# Patient Record
Sex: FEMALE | Race: WHITE | NOT HISPANIC OR LATINO | Employment: UNEMPLOYED | RURAL
[De-identification: names, ages, dates, MRNs, and addresses within clinical notes are randomized per-mention and may not be internally consistent; named-entity substitution may affect disease eponyms.]

---

## 2017-03-22 ENCOUNTER — OFFICE VISIT (OUTPATIENT)
Dept: CARDIOLOGY | Facility: CLINIC | Age: 67
End: 2017-03-22

## 2017-03-22 VITALS
HEART RATE: 64 BPM | DIASTOLIC BLOOD PRESSURE: 82 MMHG | BODY MASS INDEX: 29.63 KG/M2 | WEIGHT: 161 LBS | HEIGHT: 62 IN | SYSTOLIC BLOOD PRESSURE: 172 MMHG

## 2017-03-22 DIAGNOSIS — I10 ESSENTIAL HYPERTENSION: Primary | ICD-10-CM

## 2017-03-22 DIAGNOSIS — E78.5 DYSLIPIDEMIA: ICD-10-CM

## 2017-03-22 DIAGNOSIS — R06.02 SOB (SHORTNESS OF BREATH): ICD-10-CM

## 2017-03-22 PROCEDURE — 99213 OFFICE O/P EST LOW 20 MIN: CPT | Performed by: INTERNAL MEDICINE

## 2017-03-22 PROCEDURE — 93000 ELECTROCARDIOGRAM COMPLETE: CPT | Performed by: INTERNAL MEDICINE

## 2017-03-22 RX ORDER — LOSARTAN POTASSIUM 50 MG/1
50 TABLET ORAL 2 TIMES DAILY
COMMUNITY
End: 2018-04-09 | Stop reason: ALTCHOICE

## 2017-03-22 RX ORDER — HYDROCHLOROTHIAZIDE 12.5 MG/1
12.5 CAPSULE, GELATIN COATED ORAL EVERY MORNING
Qty: 30 CAPSULE | Refills: 7 | Status: SHIPPED | OUTPATIENT
Start: 2017-03-22 | End: 2017-11-30

## 2017-03-22 RX ORDER — PROMETHAZINE HYDROCHLORIDE 25 MG/1
25 TABLET ORAL EVERY 6 HOURS PRN
COMMUNITY
End: 2017-10-25 | Stop reason: ALTCHOICE

## 2017-03-22 NOTE — PROGRESS NOTES
"Kendrick Lemus MD  Valerie Stroud  1950      Patient Active Problem List   Diagnosis   • Essential hypertension   • Dyslipidemia   • SOB (shortness of breath)       Dear Dr. Lemus:    Jeanine Stroud is a 67 y.o. female with the problems as listed above, presents for follow up of hypertension and dyslipidemia.    History of Present Illness: Patient reports her BP runs 170s/80s at home.  She states she's been taking her medications regularly.  Denies chest pain, dizziness or syncope.  She states that she \"antoni\" at night time.    Today, patient states that she feels okay, only has chronic aches and pains all over.  She states she has fibromyalgia and arthritis. Today her blood pressure is elevated.  No Known Allergies:      Current Outpatient Prescriptions:   •  aspirin 81 MG EC tablet, Take 81 mg by mouth daily., Disp: , Rfl:   •  carvedilol (COREG) 12.5 MG tablet, Take 12.5 mg by mouth 2 (two) times a day with meals., Disp: , Rfl:   •  docusate sodium (COLACE) 100 MG capsule, Take 100 mg by mouth 2 (two) times a day., Disp: , Rfl:   •  HYDROcodone-acetaminophen (NORCO) 7.5-325 MG per tablet, Take 1 tablet by mouth every 6 (six) hours as needed for moderate pain (4-6)., Disp: , Rfl:   •  hydrOXYzine (VISTARIL) 25 MG capsule, Take 25 mg by mouth 2 (two) times a day., Disp: , Rfl:   •  losartan (COZAAR) 50 MG tablet, Take 50 mg by mouth 2 (Two) Times a Day., Disp: , Rfl:   •  metFORMIN (GLUCOPHAGE) 500 MG tablet, Take 500 mg by mouth 2 (Two) Times a Day With Meals., Disp: , Rfl:   •  promethazine (PHENERGAN) 25 MG tablet, Take 25 mg by mouth Every 6 (Six) Hours As Needed for Nausea or Vomiting., Disp: , Rfl:   •  QUEtiapine (SEROquel) 200 MG tablet, Take 200 mg by mouth every night., Disp: , Rfl:   •  simvastatin (ZOCOR) 40 MG tablet, Take 40 mg by mouth Every Night., Disp: , Rfl:   •  traMADol (ULTRAM) 50 MG tablet, Take 50 mg by mouth 2 (two) times a day., Disp: , Rfl:   •  " "hydrochlorothiazide (MICROZIDE) 12.5 MG capsule, Take 1 capsule by mouth Every Morning., Disp: 30 capsule, Rfl: 7      The following portions of the patient's history were reviewed and updated as appropriate: allergies, current medications, past family history, past medical history, past social history, past surgical history and problem list.    Social History   Substance Use Topics   • Smoking status: Current Every Day Smoker     Packs/day: 1.50     Years: 25.00     Types: Cigarettes   • Smokeless tobacco: Never Used   • Alcohol use No       Review of Systems   Constitution: Negative for chills and fever.   HENT: Negative for headaches, nosebleeds and sore throat.    Cardiovascular: Positive for leg swelling. Negative for chest pain, palpitations and syncope.   Respiratory: Negative for cough, hemoptysis, shortness of breath and wheezing.    Gastrointestinal: Positive for bloating. Negative for abdominal pain, hematemesis, hematochezia, melena, nausea and vomiting.   Genitourinary: Negative for dysuria and hematuria.   Neurological: Positive for light-headedness. Negative for dizziness.       Objective   Vitals:    03/22/17 1415   BP: 172/82   BP Location: Left arm   Patient Position: Sitting   Cuff Size: Adult   Pulse: 64   Weight: 161 lb (73 kg)   Height: 62\" (157.5 cm)     Body mass index is 29.45 kg/(m^2).        Physical Exam   Constitutional: She is oriented to person, place, and time. She appears well-developed and well-nourished.   HENT:   Head: Normocephalic.   Eyes: Conjunctivae and EOM are normal.   Neck: Normal range of motion. Neck supple. No JVD present. No tracheal deviation present. No thyromegaly present.   Cardiovascular: Normal rate and regular rhythm.  Exam reveals no gallop and no friction rub.    No murmur heard.  Pulmonary/Chest: Breath sounds normal. No respiratory distress. She has no wheezes. She has no rales.   Abdominal: Soft. Bowel sounds are normal. She exhibits no mass. There is no " tenderness.   Musculoskeletal: She exhibits no edema.   Neurological: She is alert and oriented to person, place, and time. No cranial nerve deficit.   Skin: Skin is warm and dry.   Psychiatric: She has a normal mood and affect.       Lab Results   Component Value Date     12/19/2015    K 3.6 12/19/2015     12/19/2015    CO2 29.0 12/19/2015    BUN 17 12/19/2015    CREATININE 0.69 12/19/2015    GLUCOSE 148 (H) 12/19/2015    CALCIUM 8.9 12/19/2015    AST 32 (H) 12/19/2015    ALT 29 12/19/2015    ALKPHOS 68 12/19/2015    LABIL2 1.6 12/19/2015     No results found for: CKTOTAL  Lab Results   Component Value Date    WBC 7.1 12/19/2015    HGB 13.1 12/19/2015    HCT 39.1 12/19/2015     12/19/2015     No results found for: INR  No results found for: MG  Lab Results   Component Value Date    TSH 3.039 12/19/2015      No results found for: BNP      ECG 12 Lead  Date/Time: 3/22/2017 1:52 PM  Performed by: CHRIST MORALES  Authorized by: CHRIST MORALES               Assessment/Plan :  Valerie was seen today for follow-up.  Diagnoses and all orders for this visit:    Essential hypertension with intermittent elevations of her blood pressure at home.  Chronic bilateral leg edema with no evidence of DVT.       Recommendations:    1. Start HCTZ 12.5mg po daily.  2. Advised on low sodium and low carb diet.    Return in about 7 months (around 10/22/2017).    As always, I appreciate very much the opportunity to participate in the cardiovascular care of your patients.      With Best Regards,    Christ Morales MD, [facility]    Scribed for Christ Morales MD by Briseida Henry [facility] 3/22/2017  3:17 PM     I, Christ Morales MD, personally performed the services described in this documentation as scribed by the above named individual in my presence, and it is both accurate and complete.  3/22/2017  3:17 PM

## 2017-06-01 RX ORDER — CARVEDILOL 12.5 MG/1
TABLET ORAL
Qty: 180 TABLET | Refills: 0 | Status: SHIPPED | OUTPATIENT
Start: 2017-06-01 | End: 2017-08-29 | Stop reason: SDUPTHER

## 2017-08-29 RX ORDER — CARVEDILOL 12.5 MG/1
TABLET ORAL
Qty: 180 TABLET | Refills: 2 | Status: SHIPPED | OUTPATIENT
Start: 2017-08-29 | End: 2018-07-20 | Stop reason: SDUPTHER

## 2017-10-24 ENCOUNTER — HOSPITAL ENCOUNTER (OUTPATIENT)
Dept: GENERAL RADIOLOGY | Facility: HOSPITAL | Age: 67
Discharge: HOME | End: 2017-10-24
Admitting: PHYSICIAN ASSISTANT

## 2017-10-24 ENCOUNTER — CONSULT (OUTPATIENT)
Dept: GASTROENTEROLOGY | Facility: CLINIC | Age: 67
End: 2017-10-24

## 2017-10-24 ENCOUNTER — LAB (OUTPATIENT)
Dept: LAB | Facility: HOSPITAL | Age: 67
End: 2017-10-24

## 2017-10-24 VITALS
SYSTOLIC BLOOD PRESSURE: 163 MMHG | WEIGHT: 166.8 LBS | DIASTOLIC BLOOD PRESSURE: 75 MMHG | OXYGEN SATURATION: 97 % | HEIGHT: 62 IN | BODY MASS INDEX: 30.69 KG/M2 | HEART RATE: 71 BPM

## 2017-10-24 DIAGNOSIS — R68.81 EARLY SATIETY: ICD-10-CM

## 2017-10-24 DIAGNOSIS — Z86.010 HISTORY OF COLON POLYPS: ICD-10-CM

## 2017-10-24 DIAGNOSIS — R10.11 RUQ ABDOMINAL PAIN: ICD-10-CM

## 2017-10-24 DIAGNOSIS — K59.00 CONSTIPATION, UNSPECIFIED CONSTIPATION TYPE: ICD-10-CM

## 2017-10-24 DIAGNOSIS — R10.13 EPIGASTRIC ABDOMINAL PAIN: ICD-10-CM

## 2017-10-24 DIAGNOSIS — K59.00 CONSTIPATION, UNSPECIFIED CONSTIPATION TYPE: Primary | ICD-10-CM

## 2017-10-24 DIAGNOSIS — R10.13 DYSPEPSIA: ICD-10-CM

## 2017-10-24 DIAGNOSIS — R63.5 WEIGHT GAIN, ABNORMAL: ICD-10-CM

## 2017-10-24 LAB
ALBUMIN SERPL-MCNC: 4.4 G/DL (ref 3.4–4.8)
ALBUMIN/GLOB SERPL: 1.5 G/DL (ref 1.5–2.5)
ALP SERPL-CCNC: 85 U/L (ref 35–104)
ALT SERPL W P-5'-P-CCNC: 25 U/L (ref 10–36)
AMYLASE SERPL-CCNC: 58 U/L (ref 28–100)
ANION GAP SERPL CALCULATED.3IONS-SCNC: 3.7 MMOL/L (ref 3.6–11.2)
AST SERPL-CCNC: 22 U/L (ref 10–30)
BASOPHILS # BLD AUTO: 0.02 10*3/MM3 (ref 0–0.3)
BASOPHILS NFR BLD AUTO: 0.2 % (ref 0–2)
BILIRUB SERPL-MCNC: 0.2 MG/DL (ref 0.2–1.8)
BUN BLD-MCNC: 27 MG/DL (ref 7–21)
BUN/CREAT SERPL: 35.1 (ref 7–25)
CALCIUM SPEC-SCNC: 9.9 MG/DL (ref 7.7–10)
CHLORIDE SERPL-SCNC: 107 MMOL/L (ref 99–112)
CO2 SERPL-SCNC: 29.3 MMOL/L (ref 24.3–31.9)
CREAT BLD-MCNC: 0.77 MG/DL (ref 0.43–1.29)
CRP SERPL-MCNC: 1.37 MG/DL (ref 0–0.99)
DEPRECATED RDW RBC AUTO: 43 FL (ref 37–54)
EOSINOPHIL # BLD AUTO: 0.27 10*3/MM3 (ref 0–0.7)
EOSINOPHIL NFR BLD AUTO: 3.1 % (ref 0–7)
ERYTHROCYTE [DISTWIDTH] IN BLOOD BY AUTOMATED COUNT: 13.3 % (ref 11.5–14.5)
GFR SERPL CREATININE-BSD FRML MDRD: 75 ML/MIN/1.73
GLOBULIN UR ELPH-MCNC: 3 GM/DL
GLUCOSE BLD-MCNC: 105 MG/DL (ref 70–110)
HCT VFR BLD AUTO: 39.5 % (ref 37–47)
HGB BLD-MCNC: 13.2 G/DL (ref 12–16)
IMM GRANULOCYTES # BLD: 0.02 10*3/MM3 (ref 0–0.03)
IMM GRANULOCYTES NFR BLD: 0.2 % (ref 0–0.5)
LIPASE SERPL-CCNC: 46 U/L (ref 13–60)
LYMPHOCYTES # BLD AUTO: 2.98 10*3/MM3 (ref 1–3)
LYMPHOCYTES NFR BLD AUTO: 33.8 % (ref 16–46)
MCH RBC QN AUTO: 30 PG (ref 27–33)
MCHC RBC AUTO-ENTMCNC: 33.4 G/DL (ref 33–37)
MCV RBC AUTO: 89.8 FL (ref 80–94)
MONOCYTES # BLD AUTO: 1.09 10*3/MM3 (ref 0.1–0.9)
MONOCYTES NFR BLD AUTO: 12.4 % (ref 0–12)
NEUTROPHILS # BLD AUTO: 4.43 10*3/MM3 (ref 1.4–6.5)
NEUTROPHILS NFR BLD AUTO: 50.3 % (ref 40–75)
OSMOLALITY SERPL CALC.SUM OF ELEC: 284.9 MOSM/KG (ref 273–305)
PLATELET # BLD AUTO: 264 10*3/MM3 (ref 130–400)
PMV BLD AUTO: 9.9 FL (ref 6–10)
POTASSIUM BLD-SCNC: 3.7 MMOL/L (ref 3.5–5.3)
PROT SERPL-MCNC: 7.4 G/DL (ref 6–8)
RBC # BLD AUTO: 4.4 10*6/MM3 (ref 4.2–5.4)
SODIUM BLD-SCNC: 140 MMOL/L (ref 135–153)
T4 FREE SERPL-MCNC: 0.92 NG/DL (ref 0.89–1.76)
TSH SERPL DL<=0.05 MIU/L-ACNC: 3.29 MIU/ML (ref 0.55–4.78)
WBC NRBC COR # BLD: 8.81 10*3/MM3 (ref 4.5–12.5)

## 2017-10-24 PROCEDURE — 86140 C-REACTIVE PROTEIN: CPT | Performed by: PHYSICIAN ASSISTANT

## 2017-10-24 PROCEDURE — 82150 ASSAY OF AMYLASE: CPT | Performed by: PHYSICIAN ASSISTANT

## 2017-10-24 PROCEDURE — 74020 HC XR ABDOMEN FLAT & UPRIGHT: CPT

## 2017-10-24 PROCEDURE — 84439 ASSAY OF FREE THYROXINE: CPT | Performed by: PHYSICIAN ASSISTANT

## 2017-10-24 PROCEDURE — 74020 XR ABDOMEN FLAT AND UPRIGHT: CPT | Performed by: RADIOLOGY

## 2017-10-24 PROCEDURE — 99214 OFFICE O/P EST MOD 30 MIN: CPT | Performed by: PHYSICIAN ASSISTANT

## 2017-10-24 PROCEDURE — 80053 COMPREHEN METABOLIC PANEL: CPT | Performed by: PHYSICIAN ASSISTANT

## 2017-10-24 PROCEDURE — 83690 ASSAY OF LIPASE: CPT | Performed by: PHYSICIAN ASSISTANT

## 2017-10-24 PROCEDURE — 84443 ASSAY THYROID STIM HORMONE: CPT | Performed by: PHYSICIAN ASSISTANT

## 2017-10-24 PROCEDURE — 36415 COLL VENOUS BLD VENIPUNCTURE: CPT

## 2017-10-24 PROCEDURE — 85025 COMPLETE CBC W/AUTO DIFF WBC: CPT | Performed by: PHYSICIAN ASSISTANT

## 2017-10-24 RX ORDER — GLIMEPIRIDE 4 MG/1
4 TABLET ORAL
COMMUNITY

## 2017-10-24 RX ORDER — OMEPRAZOLE 40 MG/1
40 CAPSULE, DELAYED RELEASE ORAL DAILY
COMMUNITY
End: 2019-06-24

## 2017-10-24 RX ORDER — BISACODYL 5 MG/1
TABLET, DELAYED RELEASE ORAL
Qty: 2 TABLET | Refills: 0 | Status: SHIPPED | OUTPATIENT
Start: 2017-10-24 | End: 2017-10-25 | Stop reason: ALTCHOICE

## 2017-10-25 ENCOUNTER — OFFICE VISIT (OUTPATIENT)
Dept: CARDIOLOGY | Facility: CLINIC | Age: 67
End: 2017-10-25

## 2017-10-25 ENCOUNTER — TELEPHONE (OUTPATIENT)
Dept: GASTROENTEROLOGY | Facility: CLINIC | Age: 67
End: 2017-10-25

## 2017-10-25 VITALS
BODY MASS INDEX: 30.91 KG/M2 | DIASTOLIC BLOOD PRESSURE: 70 MMHG | HEIGHT: 62 IN | WEIGHT: 168 LBS | RESPIRATION RATE: 16 BRPM | SYSTOLIC BLOOD PRESSURE: 137 MMHG | HEART RATE: 61 BPM

## 2017-10-25 DIAGNOSIS — I10 ESSENTIAL HYPERTENSION: Primary | ICD-10-CM

## 2017-10-25 DIAGNOSIS — R60.9 EDEMA, UNSPECIFIED TYPE: ICD-10-CM

## 2017-10-25 DIAGNOSIS — E78.5 DYSLIPIDEMIA: ICD-10-CM

## 2017-10-25 DIAGNOSIS — R06.00 DYSPNEA, UNSPECIFIED TYPE: ICD-10-CM

## 2017-10-25 PROBLEM — R10.11 RUQ ABDOMINAL PAIN: Status: ACTIVE | Noted: 2017-10-25

## 2017-10-25 PROBLEM — R63.5 WEIGHT GAIN, ABNORMAL: Status: ACTIVE | Noted: 2017-10-25

## 2017-10-25 PROBLEM — Z86.010 HISTORY OF COLON POLYPS: Status: ACTIVE | Noted: 2017-10-25

## 2017-10-25 PROBLEM — K59.00 CONSTIPATION: Status: ACTIVE | Noted: 2017-10-25

## 2017-10-25 PROBLEM — R10.13 DYSPEPSIA: Status: ACTIVE | Noted: 2017-10-25

## 2017-10-25 PROBLEM — Z86.0100 HISTORY OF COLON POLYPS: Status: ACTIVE | Noted: 2017-10-25

## 2017-10-25 PROBLEM — R10.13 EPIGASTRIC ABDOMINAL PAIN: Status: ACTIVE | Noted: 2017-10-25

## 2017-10-25 PROBLEM — R68.81 EARLY SATIETY: Status: ACTIVE | Noted: 2017-10-25

## 2017-10-25 PROCEDURE — 99213 OFFICE O/P EST LOW 20 MIN: CPT | Performed by: INTERNAL MEDICINE

## 2017-10-25 PROCEDURE — 93000 ELECTROCARDIOGRAM COMPLETE: CPT | Performed by: INTERNAL MEDICINE

## 2017-10-25 RX ORDER — FUROSEMIDE 20 MG/1
20 TABLET ORAL DAILY
Qty: 180 TABLET | Refills: 1 | Status: SHIPPED | OUTPATIENT
Start: 2017-10-25 | End: 2020-08-05 | Stop reason: ALTCHOICE

## 2017-10-25 RX ORDER — POLYETHYLENE GLYCOL 3350 17 G/17G
17 POWDER, FOR SOLUTION ORAL 2 TIMES DAILY
Qty: 510 G | Refills: 5 | Status: SHIPPED | OUTPATIENT
Start: 2017-10-25 | End: 2017-10-25 | Stop reason: ALTCHOICE

## 2017-10-25 RX ORDER — GABAPENTIN 300 MG/1
300 CAPSULE ORAL 3 TIMES DAILY
COMMUNITY
End: 2019-05-08 | Stop reason: ALTCHOICE

## 2017-10-25 NOTE — TELEPHONE ENCOUNTER
Patient's labs were ok other than mildly increased CRP (marker of inflammation). Abdominal film was read as normal but on my review, there is increased stool on the right side. She should have magnesium citrate clean out (2 bottles) then start taking Miralax 17 g BID until her procedure then clean out again. Please let her know. Thanks.

## 2017-10-25 NOTE — PROGRESS NOTES
Kendrick Lemus MD  Valerie Stroud  1950  10/25/2017    Patient Active Problem List   Diagnosis   • Essential hypertension   • Dyslipidemia   • Dyspnea, unspecified   • Early satiety   • Dyspepsia   • Epigastric abdominal pain   • RUQ abdominal pain   • Weight gain, abnormal   • History of colon polyps   • Constipation       Dear Kendrick Lemus MD:    Jeanine Stroud is a 67 y.o. female with the problems as listed above, presentsFor follow-up of dyspnea and leg edema.      History of Present Illness: Ms. Stroud is a pleasant 67-year-old  female with a history of chronic bilateral leg edema and intermittent dyspnea.  She has no history of known heart failure or coronary artery disease.  She denies any chest pains.  She has one pillow orthopnea.  presents for routine cardiology follow up.  She reports chronic bilateral leg edema.       No Known Allergies:      Current Outpatient Prescriptions:   •  aspirin 81 MG EC tablet, Take 81 mg by mouth daily., Disp: , Rfl:   •  carvedilol (COREG) 12.5 MG tablet, TAKE ONE TABLET BY MOUTH TWO TIMES A DAY FOR THE HEART OR BLOOD PRESSURE, Disp: 180 tablet, Rfl: 2  •  docusate sodium (COLACE) 100 MG capsule, Take 100 mg by mouth 2 (two) times a day., Disp: , Rfl:   •  gabapentin (NEURONTIN) 300 MG capsule, Take 300 mg by mouth 3 (Three) Times a Day., Disp: , Rfl:   •  glimepiride (AMARYL) 1 MG tablet, Take 1 mg by mouth Every Morning Before Breakfast., Disp: , Rfl:   •  hydrochlorothiazide (MICROZIDE) 12.5 MG capsule, Take 1 capsule by mouth Every Morning., Disp: 30 capsule, Rfl: 7  •  hydrOXYzine (VISTARIL) 25 MG capsule, Take 25 mg by mouth 2 (two) times a day., Disp: , Rfl:   •  losartan (COZAAR) 50 MG tablet, Take 50 mg by mouth 2 (Two) Times a Day., Disp: , Rfl:   •  omeprazole (priLOSEC) 40 MG capsule, Take 40 mg by mouth Daily., Disp: , Rfl:   •  QUEtiapine (SEROquel) 200 MG tablet, Take 200 mg by mouth every night., Disp: , Rfl:  "  •  simvastatin (ZOCOR) 40 MG tablet, Take 40 mg by mouth Every Night., Disp: , Rfl:   •  traMADol (ULTRAM) 50 MG tablet, Take 50 mg by mouth 2 (two) times a day., Disp: , Rfl:   •  furosemide (LASIX) 20 MG tablet, Take 1 tablet by mouth Daily., Disp: 180 tablet, Rfl: 1      The following portions of the patient's history were reviewed and updated as appropriate: allergies, current medications, past family history, past medical history, past social history, past surgical history and problem list.    Social History   Substance Use Topics   • Smoking status: Current Every Day Smoker     Packs/day: 1.50     Years: 25.00     Types: Cigarettes   • Smokeless tobacco: Never Used   • Alcohol use No       Review of Systems   Constitution: Negative for chills and fever.   HENT: Negative for nosebleeds and sore throat.    Cardiovascular: Positive for chest pain, leg swelling and orthopnea.   Respiratory: Negative for cough, hemoptysis and wheezing.    Gastrointestinal: Negative for abdominal pain, hematemesis, hematochezia, melena, nausea and vomiting.   Genitourinary: Negative for dysuria and hematuria.   Neurological: Negative for headaches.       Objective   Vitals:    10/25/17 1236   BP: 137/70   Pulse: 61   Resp: 16   Weight: 168 lb (76.2 kg)   Height: 62\" (157.5 cm)     Body mass index is 30.73 kg/(m^2).        Physical Exam   Constitutional: She is oriented to person, place, and time. She appears well-developed and well-nourished.   HENT:   Head: Normocephalic.   Eyes: Conjunctivae and EOM are normal.   Neck: Normal range of motion. Neck supple. No JVD present. No tracheal deviation present. No thyromegaly present.   Cardiovascular: Normal rate and regular rhythm.  Exam reveals no gallop and no friction rub.    No murmur heard.  Pulmonary/Chest: Breath sounds normal. No respiratory distress. She has no wheezes. She has no rales.   Abdominal: Soft. Bowel sounds are normal. She exhibits no mass. There is no tenderness. "   Musculoskeletal: She exhibits edema (1+ bilateral leg edema).   Neurological: She is alert and oriented to person, place, and time. No cranial nerve deficit.   Skin: Skin is warm and dry.   Psychiatric: She has a normal mood and affect.       Lab Results   Component Value Date     10/24/2017    K 3.7 10/24/2017     10/24/2017    CO2 29.3 10/24/2017    BUN 27 (H) 10/24/2017    CREATININE 0.77 10/24/2017    GLUCOSE 105 10/24/2017    CALCIUM 9.9 10/24/2017    AST 22 10/24/2017    ALT 25 10/24/2017    ALKPHOS 85 10/24/2017    LABIL2 1.5 10/24/2017     No results found for: CKTOTAL  Lab Results   Component Value Date    WBC 8.81 10/24/2017    HGB 13.2 10/24/2017    HCT 39.5 10/24/2017     10/24/2017     No results found for: INR  No results found for: MG  Lab Results   Component Value Date    TSH 3.293 10/24/2017      No results found for: BNP  Echo   No results found for: ECHOEFEST    ECG 12 Lead  Date/Time: 10/25/2017 12:41 PM  Performed by: OBDULIA MORALES  Authorized by: OBDULIA MORALES   Rhythm: sinus rhythm  Comments: Nonspecific ST-T wave changes noted bilaterally wall.            Assessment/Plan    Diagnosis Plan   1. Dyspnea, unspecified type  Check BNP and BMP.     2. Dyslipidemia     3. Essential hypertension       4. Edema, unspecified type  furosemide (LASIX) 20 MG tablet        Recommendations:     1. Evaluate her LV function with an echo Doppler study.  2. Start Lasix 20 mg daily.  3. Check BMP and BNP.    Return in about 3 months (around 1/25/2018).    As always, I appreciate very much the opportunity to participate in the cardiovascular care of your patients.      With Best Regards,      SANDRA Morales MD, FACC    Dragon disclaimer:  Much of this encounter note is an electronic transcription/translation of spoken language to printed text. The electronic translation of spoken language may permit erroneous, or at times, nonsensical words or phrases to be inadvertently transcribed; Although I  have reviewed the note for such errors, some may still exist.

## 2017-11-27 ENCOUNTER — HOSPITAL ENCOUNTER (OUTPATIENT)
Dept: CARDIOLOGY | Facility: HOSPITAL | Age: 67
Discharge: HOME | End: 2017-11-27
Attending: INTERNAL MEDICINE | Admitting: INTERNAL MEDICINE

## 2017-11-27 DIAGNOSIS — R06.00 DYSPNEA, UNSPECIFIED TYPE: ICD-10-CM

## 2017-11-27 PROCEDURE — 93306 TTE W/DOPPLER COMPLETE: CPT

## 2017-11-27 PROCEDURE — 93306 TTE W/DOPPLER COMPLETE: CPT | Performed by: INTERNAL MEDICINE

## 2017-11-28 DIAGNOSIS — Z79.899 DRUG THERAPY: Primary | ICD-10-CM

## 2017-12-01 ENCOUNTER — HOSPITAL ENCOUNTER (OUTPATIENT)
Facility: HOSPITAL | Age: 67
Setting detail: HOSPITAL OUTPATIENT SURGERY
Discharge: HOME | End: 2017-12-01
Attending: INTERNAL MEDICINE | Admitting: INTERNAL MEDICINE

## 2017-12-01 ENCOUNTER — ANESTHESIA EVENT (OUTPATIENT)
Dept: PERIOP | Facility: HOSPITAL | Age: 67
End: 2017-12-01

## 2017-12-01 ENCOUNTER — ANESTHESIA (OUTPATIENT)
Dept: PERIOP | Facility: HOSPITAL | Age: 67
End: 2017-12-01

## 2017-12-01 VITALS
BODY MASS INDEX: 30.55 KG/M2 | HEART RATE: 68 BPM | SYSTOLIC BLOOD PRESSURE: 142 MMHG | OXYGEN SATURATION: 96 % | HEIGHT: 62 IN | WEIGHT: 166 LBS | RESPIRATION RATE: 20 BRPM | TEMPERATURE: 97.6 F | DIASTOLIC BLOOD PRESSURE: 63 MMHG

## 2017-12-01 DIAGNOSIS — R10.13 DYSPEPSIA: ICD-10-CM

## 2017-12-01 DIAGNOSIS — R68.81 EARLY SATIETY: ICD-10-CM

## 2017-12-01 DIAGNOSIS — R10.13 EPIGASTRIC ABDOMINAL PAIN: ICD-10-CM

## 2017-12-01 DIAGNOSIS — Z86.010 HISTORY OF COLON POLYPS: ICD-10-CM

## 2017-12-01 DIAGNOSIS — R63.5 WEIGHT GAIN, ABNORMAL: ICD-10-CM

## 2017-12-01 DIAGNOSIS — K59.00 CONSTIPATION, UNSPECIFIED CONSTIPATION TYPE: ICD-10-CM

## 2017-12-01 DIAGNOSIS — R10.11 RUQ ABDOMINAL PAIN: ICD-10-CM

## 2017-12-01 PROBLEM — Z86.0100 HISTORY OF COLON POLYPS: Status: ACTIVE | Noted: 2017-10-25

## 2017-12-01 LAB
BH CV ECHO MEAS - % IVS THICK: 55.7 %
BH CV ECHO MEAS - % LVPW THICK: 101 %
BH CV ECHO MEAS - ACS: 2.2 CM
BH CV ECHO MEAS - AO ROOT AREA (BSA CORRECTED): 1.9
BH CV ECHO MEAS - AO ROOT AREA: 8.7 CM^2
BH CV ECHO MEAS - AO ROOT DIAM: 3.3 CM
BH CV ECHO MEAS - BSA(HAYCOCK): 1.9 M^2
BH CV ECHO MEAS - BSA: 1.8 M^2
BH CV ECHO MEAS - BZI_BMI: 30.7 KILOGRAMS/M^2
BH CV ECHO MEAS - BZI_METRIC_HEIGHT: 157.5 CM
BH CV ECHO MEAS - BZI_METRIC_WEIGHT: 76.2 KG
BH CV ECHO MEAS - CONTRAST EF 4CH: 52.1 ML/M^2
BH CV ECHO MEAS - EDV(CUBED): 122.6 ML
BH CV ECHO MEAS - EDV(MOD-SP4): 48 ML
BH CV ECHO MEAS - EDV(TEICH): 116.5 ML
BH CV ECHO MEAS - EF(CUBED): 55.4 %
BH CV ECHO MEAS - EF(TEICH): 47 %
BH CV ECHO MEAS - ESV(CUBED): 54.6 ML
BH CV ECHO MEAS - ESV(MOD-SP4): 23 ML
BH CV ECHO MEAS - ESV(TEICH): 61.7 ML
BH CV ECHO MEAS - FS: 23.6 %
BH CV ECHO MEAS - IVS/LVPW: 1.1
BH CV ECHO MEAS - IVSD: 0.9 CM
BH CV ECHO MEAS - IVSS: 1.4 CM
BH CV ECHO MEAS - LA DIMENSION: 2.9 CM
BH CV ECHO MEAS - LA/AO: 0.88
BH CV ECHO MEAS - LV DIASTOLIC VOL/BSA (35-75): 27 ML/M^2
BH CV ECHO MEAS - LV MASS(C)D: 145 GRAMS
BH CV ECHO MEAS - LV MASS(C)DI: 81.7 GRAMS/M^2
BH CV ECHO MEAS - LV MASS(C)S: 216.9 GRAMS
BH CV ECHO MEAS - LV MASS(C)SI: 122.2 GRAMS/M^2
BH CV ECHO MEAS - LV SYSTOLIC VOL/BSA (12-30): 13 ML/M^2
BH CV ECHO MEAS - LVIDD: 5 CM
BH CV ECHO MEAS - LVIDS: 3.8 CM
BH CV ECHO MEAS - LVLD AP4: 7 CM
BH CV ECHO MEAS - LVLS AP4: 6 CM
BH CV ECHO MEAS - LVOT AREA (M): 3.8 CM^2
BH CV ECHO MEAS - LVOT AREA: 3.8 CM^2
BH CV ECHO MEAS - LVOT DIAM: 2.2 CM
BH CV ECHO MEAS - LVPWD: 0.8 CM
BH CV ECHO MEAS - LVPWS: 1.6 CM
BH CV ECHO MEAS - MV A MAX VEL: 113 CM/SEC
BH CV ECHO MEAS - MV E MAX VEL: 94.8 CM/SEC
BH CV ECHO MEAS - MV E/A: 0.84
BH CV ECHO MEAS - PA ACC SLOPE: 1142 CM/SEC^2
BH CV ECHO MEAS - PA ACC TIME: 0.08 SEC
BH CV ECHO MEAS - PA PR(ACCEL): 41 MMHG
BH CV ECHO MEAS - RAP SYSTOLE: 10 MMHG
BH CV ECHO MEAS - RVSP: 34.4 MMHG
BH CV ECHO MEAS - SI(CUBED): 38.3 ML/M^2
BH CV ECHO MEAS - SI(MOD-SP4): 14.1 ML/M^2
BH CV ECHO MEAS - SI(TEICH): 30.8 ML/M^2
BH CV ECHO MEAS - SV(CUBED): 67.9 ML
BH CV ECHO MEAS - SV(MOD-SP4): 25 ML
BH CV ECHO MEAS - SV(TEICH): 54.7 ML
BH CV ECHO MEAS - TR MAX VEL: 247 CM/SEC
MAXIMAL PREDICTED HEART RATE: 152 BPM
STRESS TARGET HR: 129 BPM

## 2017-12-01 PROCEDURE — 25010000002 FENTANYL CITRATE (PF) 100 MCG/2ML SOLUTION: Performed by: NURSE ANESTHETIST, CERTIFIED REGISTERED

## 2017-12-01 PROCEDURE — 25010000002 PROPOFOL 10 MG/ML EMULSION: Performed by: NURSE ANESTHETIST, CERTIFIED REGISTERED

## 2017-12-01 PROCEDURE — 25010000002 MIDAZOLAM PER 1 MG: Performed by: NURSE ANESTHETIST, CERTIFIED REGISTERED

## 2017-12-01 PROCEDURE — 43239 EGD BIOPSY SINGLE/MULTIPLE: CPT | Performed by: INTERNAL MEDICINE

## 2017-12-01 PROCEDURE — 25010000002 PROPOFOL 1000 MG/ML EMULSION: Performed by: NURSE ANESTHETIST, CERTIFIED REGISTERED

## 2017-12-01 PROCEDURE — 88305 TISSUE EXAM BY PATHOLOGIST: CPT | Performed by: INTERNAL MEDICINE

## 2017-12-01 PROCEDURE — 45378 DIAGNOSTIC COLONOSCOPY: CPT | Performed by: INTERNAL MEDICINE

## 2017-12-01 RX ORDER — MIDAZOLAM HYDROCHLORIDE 1 MG/ML
INJECTION INTRAMUSCULAR; INTRAVENOUS AS NEEDED
Status: DISCONTINUED | OUTPATIENT
Start: 2017-12-01 | End: 2017-12-01 | Stop reason: SURG

## 2017-12-01 RX ORDER — SODIUM CHLORIDE, SODIUM LACTATE, POTASSIUM CHLORIDE, CALCIUM CHLORIDE 600; 310; 30; 20 MG/100ML; MG/100ML; MG/100ML; MG/100ML
125 INJECTION, SOLUTION INTRAVENOUS CONTINUOUS
Status: DISCONTINUED | OUTPATIENT
Start: 2017-12-01 | End: 2017-12-01 | Stop reason: HOSPADM

## 2017-12-01 RX ORDER — PROPOFOL 10 MG/ML
VIAL (ML) INTRAVENOUS AS NEEDED
Status: DISCONTINUED | OUTPATIENT
Start: 2017-12-01 | End: 2017-12-01 | Stop reason: SURG

## 2017-12-01 RX ORDER — SODIUM CHLORIDE 0.9 % (FLUSH) 0.9 %
1-10 SYRINGE (ML) INJECTION AS NEEDED
Status: DISCONTINUED | OUTPATIENT
Start: 2017-12-01 | End: 2017-12-01 | Stop reason: HOSPADM

## 2017-12-01 RX ORDER — ONDANSETRON 2 MG/ML
4 INJECTION INTRAMUSCULAR; INTRAVENOUS ONCE AS NEEDED
Status: DISCONTINUED | OUTPATIENT
Start: 2017-12-01 | End: 2017-12-01 | Stop reason: HOSPADM

## 2017-12-01 RX ORDER — IPRATROPIUM BROMIDE AND ALBUTEROL SULFATE 2.5; .5 MG/3ML; MG/3ML
3 SOLUTION RESPIRATORY (INHALATION) ONCE AS NEEDED
Status: DISCONTINUED | OUTPATIENT
Start: 2017-12-01 | End: 2017-12-01 | Stop reason: HOSPADM

## 2017-12-01 RX ORDER — FENTANYL CITRATE 50 UG/ML
INJECTION, SOLUTION INTRAMUSCULAR; INTRAVENOUS AS NEEDED
Status: DISCONTINUED | OUTPATIENT
Start: 2017-12-01 | End: 2017-12-01 | Stop reason: SURG

## 2017-12-01 RX ADMIN — PROPOFOL 120 MCG/KG/MIN: 10 INJECTION, EMULSION INTRAVENOUS at 12:17

## 2017-12-01 RX ADMIN — FENTANYL CITRATE 50 MCG: 50 INJECTION INTRAMUSCULAR; INTRAVENOUS at 12:13

## 2017-12-01 RX ADMIN — MIDAZOLAM HYDROCHLORIDE 1 MG: 1 INJECTION, SOLUTION INTRAMUSCULAR; INTRAVENOUS at 12:16

## 2017-12-01 RX ADMIN — PROPOFOL 60 MG: 10 INJECTION, EMULSION INTRAVENOUS at 12:17

## 2017-12-01 RX ADMIN — SODIUM CHLORIDE, POTASSIUM CHLORIDE, SODIUM LACTATE AND CALCIUM CHLORIDE: 600; 310; 30; 20 INJECTION, SOLUTION INTRAVENOUS at 12:13

## 2017-12-01 RX ADMIN — FENTANYL CITRATE 50 MCG: 50 INJECTION INTRAMUSCULAR; INTRAVENOUS at 12:16

## 2017-12-01 RX ADMIN — MIDAZOLAM HYDROCHLORIDE 1 MG: 1 INJECTION, SOLUTION INTRAMUSCULAR; INTRAVENOUS at 12:13

## 2017-12-01 NOTE — PLAN OF CARE
Problem: GI Endoscopy (Adult)  Goal: Signs and Symptoms of Listed Potential Problems Will be Absent or Manageable (GI Endoscopy)  Outcome: Ongoing (interventions implemented as appropriate)    12/01/17 1219   GI Endoscopy   Problems Assessed (GI Endoscopy) all   Problems Present (GI Endoscopy) none

## 2017-12-01 NOTE — ANESTHESIA PREPROCEDURE EVALUATION
Anesthesia Evaluation     no history of anesthetic complications:  NPO Solid Status: > 8 hours  NPO Liquid Status: > 8 hours     Airway   Mallampati: II  TM distance: >3 FB  Neck ROM: full  no difficulty expected  Dental      Pulmonary - normal exam   (+) shortness of breath,   Cardiovascular - normal exam    (+) hypertension, hyperlipidemia      Neuro/Psych  GI/Hepatic/Renal/Endo    (+)  GERD, diabetes mellitus,     Musculoskeletal     Abdominal  - normal exam   Substance History      OB/GYN          Other                                    Anesthesia Plan    ASA 3     general     intravenous induction   Anesthetic plan and risks discussed with patient.

## 2017-12-01 NOTE — ANESTHESIA POSTPROCEDURE EVALUATION
Patient: Valerie Stroud    Procedure Summary     Date Anesthesia Start Anesthesia Stop Room / Location    12/01/17 1213 1231 [facility] / [facility]       Procedure Diagnosis Surgeon Provider    ESOPHAGOGASTRODUODENOSCOPY WITH BIOPSY CPT CODE: 62895 (N/A Esophagus); COLONOSCOPY FOR SCREENING CPT CODE:  (N/A ) Early satiety; Dyspepsia; Epigastric abdominal pain; RUQ abdominal pain; Weight gain, abnormal; History of colon polyps; Constipation, unspecified constipation type  (Early satiety [R68.81]; Dyspepsia [R10.13]; Epigastric abdominal pain [R10.13]; RUQ abdominal pain [R10.11]; Weight gain, abnormal [R63.5]; History of colon polyps [Z86.010]; Constipation, unspecified constipation type [K59.00]) MD Adan Villafana III, MD          Anesthesia Type: general  Last vitals  BP   168/79 (12/01/17 1022)   Temp   97.8 °F (36.6 °C) (12/01/17 1022)   Pulse   78 (12/01/17 1022)   Resp   20 (12/01/17 1022)     SpO2   98 % (12/01/17 1022)     Post Anesthesia Care and Evaluation    Patient location during evaluation: bedside  Patient participation: complete - patient participated  Level of consciousness: awake and alert  Pain score: 1  Pain management: adequate  Airway patency: patent  Anesthetic complications: No anesthetic complications  PONV Status: none  Cardiovascular status: acceptable  Respiratory status: acceptable  Hydration status: acceptable

## 2017-12-01 NOTE — H&P
History and physical examination  December 1, 2017    HPI  67-year-old white female presents for EGD and colonoscopy in order to investigate recurrent abdominal pain, early satiety, constipation.  She has history of colonic polyps.  Colonoscopy was last performed about 5 years ago.      Review of Systems   Negative and noncontributory.    ACTIVE PROBLEMS:   Specialty Problems        Gastroenterology Problems    Constipation              PAST MEDICAL HISTORY:  Past Medical History:   Diagnosis Date   • Arthritis    • DMII (diabetes mellitus, type 2)    • Dyslipidemia    • GERD (gastroesophageal reflux disease)    • HTN (hypertension)    • Hyperlipidemia        SURGICAL HISTORY:  Past Surgical History:   Procedure Laterality Date   • BLADDER SUSPENSION     • CATARACT EXTRACTION W/ INTRAOCULAR LENS  IMPLANT, BILATERAL     • CHOLECYSTECTOMY  11/04/2015    laparoscopic   • COLONOSCOPY  03/30/2009   • ENDOSCOPY  02/2002   • KNEE SURGERY Bilateral    • PARTIAL HYSTERECTOMY     • VARICOSE VEIN SURGERY         FAMILY HISTORY:  Family History   Problem Relation Age of Onset   • Coronary artery disease Mother    • Coronary artery disease Father 69     fatal MI age 69   • Heart attack Father    • Heart defect Sister      pacemaker   • Liver cancer Sister    • Heart attack Brother 29     s/p CAGB   • Coronary artery disease Brother      fatal MI, age 50   • Heart attack Brother 39       SOCIAL HISTORY:  Social History   Substance Use Topics   • Smoking status: Current Every Day Smoker     Packs/day: 0.50     Years: 25.00     Types: Cigarettes   • Smokeless tobacco: Never Used   • Alcohol use No       CURRENT MEDICATION:    Current Facility-Administered Medications:   •  lactated ringers infusion, 125 mL/hr, Intravenous, Continuous, Adan Orozco MD  •  lactated ringers infusion, 125 mL/hr, Intravenous, Continuous, Adan Orozco MD  •  sodium chloride 0.9 % flush 1-10 mL, 1-10 mL, Intravenous, PRN, Adan Orozco MD  •   sodium chloride 0.9 % flush 1-10 mL, 1-10 mL, Intravenous, PRN, Adan Orozco MD     I had reviewed her list of current medications.    ALLERGIES:  Review of patient's allergies indicates no known allergies.    VISIT VITALS:  /79 (BP Location: Right arm, Patient Position: Lying)  Pulse 78  Temp 97.8 °F (36.6 °C) (Oral)   Resp 20  SpO2 98%    PHYSICAL EXAMINATION:  Physical Exam   Alert, oriented ×3  HEENT: Normal  Neck: No mass  Chest: Clear  Rec: Regular rhythm  Abdomen: Soft, nontender, active BS  Extremities: No edema  Neuro: No focal deficit    Assessment/Plan   Generalized abdominal pain  Early satiety  Constipation  History of colon polyps  Colon cancer screening    REC  EGD and screening/surveillance colonoscopy are planned.  The procedures, benefits, risks and alternatives were explained to the patient.         Deshawn Ibanez III, MD

## 2017-12-01 NOTE — OP NOTE
12/01/17    ESOPHAGOGASTRODUODENOSCOPY WITH BIOPSY, COLONOSCOPY FOR SCREENING  Procedure Note    Valerie Stroud  12/1/2017    Pre-op Diagnosis: Abdominal pain, early satiety, constipation, history of colon polyps, colon cancer screening, last colonoscopy was performed about 5 years ago      Post-op Diagnosis:   -Mild gastritis  -Normal colonoscopy        Anesthesia: Per Anesthesia service, general anesthesia      Estimated Blood Loss: Negligible      Findings: EGD performed with careful examination of the esophagus, stomach and duodenum to the fourth portion.  Mild nonerosive gastritis it was present.  The examination was otherwise normal.  Biopsies were taken randomly from the gastric antrum and from the duodenum.    Normal rectal examination.  Colonoscopy performed to the cecum, confirmed by identification of typical cecal anatomy.  Bowel preparation was satisfactory.  All areas were examined carefully and no abnormality was seen.    She tolerated the procedures well and there were no complications.  She left the OR in stable and satisfactory condition.      Complications: None      Recommendations:   Findings were discussed with the patient  Await findings of biopsies and stool studies  Repeat screening/surveillance colonoscopy in about 5 years      Deshawn Ibanez III, MD     Date: 12/1/2017  Time: 12:31 PM     CC:  Dr. Kendrick Lemus

## 2017-12-04 LAB
LAB AP CASE REPORT: NORMAL
Lab: NORMAL
PATH REPORT.FINAL DX SPEC: NORMAL

## 2017-12-07 DIAGNOSIS — Z79.899 DRUG THERAPY CHANGED: Primary | ICD-10-CM

## 2017-12-19 ENCOUNTER — TELEPHONE (OUTPATIENT)
Dept: CARDIOLOGY | Facility: CLINIC | Age: 67
End: 2017-12-19

## 2017-12-19 NOTE — TELEPHONE ENCOUNTER
Improvement noted with recently changing furosemide only as needed.  Her sugar was up at 220.  See if she has known diabetes.  Follow-up with PCP if sugars are running high.

## 2017-12-19 NOTE — TELEPHONE ENCOUNTER
Pt called and left me a message stating she was wanting to know her results to her labs she had done.

## 2017-12-20 PROBLEM — E11.9 TYPE 2 DIABETES MELLITUS WITHOUT COMPLICATION (HCC): Status: ACTIVE | Noted: 2017-12-20

## 2017-12-20 PROBLEM — E10.9 TYPE 1 DIABETES MELLITUS (HCC): Status: ACTIVE | Noted: 2017-12-20

## 2017-12-20 PROBLEM — E11.8 TYPE 2 DIABETES MELLITUS WITH COMPLICATION: Status: ACTIVE | Noted: 2017-12-20

## 2017-12-20 NOTE — TELEPHONE ENCOUNTER
Called and spoke with patient.  Given message per Deyanira Spivey PA-C.   Ms Stroud voiced understanding. I forwarded copy of labs to her PCP.  She stated she has been diagnosed with Diabetes mellitus.  Stated her sugars have been running high and she sees her PCP soon for blood work.

## 2018-02-02 ENCOUNTER — TELEPHONE (OUTPATIENT)
Dept: CARDIOLOGY | Facility: CLINIC | Age: 68
End: 2018-02-02

## 2018-02-02 NOTE — TELEPHONE ENCOUNTER
----- Message from Briseida Henry CMA sent at 1/30/2018  1:05 PM EST -----  Repeat labs done on 12/14/17 attached.

## 2018-04-04 ENCOUNTER — APPOINTMENT (OUTPATIENT)
Dept: CT IMAGING | Facility: HOSPITAL | Age: 68
End: 2018-04-04

## 2018-04-04 ENCOUNTER — HOSPITAL ENCOUNTER (EMERGENCY)
Facility: HOSPITAL | Age: 68
Discharge: HOME | End: 2018-04-04
Attending: EMERGENCY MEDICINE | Admitting: EMERGENCY MEDICINE

## 2018-04-04 VITALS
BODY MASS INDEX: 27.6 KG/M2 | HEART RATE: 73 BPM | SYSTOLIC BLOOD PRESSURE: 163 MMHG | RESPIRATION RATE: 16 BRPM | DIASTOLIC BLOOD PRESSURE: 77 MMHG | TEMPERATURE: 98.2 F | OXYGEN SATURATION: 97 % | WEIGHT: 150 LBS | HEIGHT: 62 IN

## 2018-04-04 DIAGNOSIS — I16.0 HYPERTENSIVE URGENCY: Primary | ICD-10-CM

## 2018-04-04 LAB
ALBUMIN SERPL-MCNC: 4.2 G/DL (ref 3.4–4.8)
ALBUMIN/GLOB SERPL: 1.4 G/DL (ref 1.5–2.5)
ALP SERPL-CCNC: 94 U/L (ref 35–104)
ALT SERPL W P-5'-P-CCNC: 24 U/L (ref 10–36)
ANION GAP SERPL CALCULATED.3IONS-SCNC: 5.7 MMOL/L (ref 3.6–11.2)
AST SERPL-CCNC: 24 U/L (ref 10–30)
BASOPHILS # BLD AUTO: 0.02 10*3/MM3 (ref 0–0.3)
BASOPHILS NFR BLD AUTO: 0.3 % (ref 0–2)
BILIRUB SERPL-MCNC: 0.2 MG/DL (ref 0.2–1.8)
BILIRUB UR QL STRIP: NEGATIVE
BUN BLD-MCNC: 16 MG/DL (ref 7–21)
BUN/CREAT SERPL: 21.6 (ref 7–25)
CALCIUM SPEC-SCNC: 10.1 MG/DL (ref 7.7–10)
CHLORIDE SERPL-SCNC: 106 MMOL/L (ref 99–112)
CK MB SERPL-CCNC: 0.71 NG/ML (ref 0–5)
CK MB SERPL-RTO: 0.9 % (ref 0–3)
CK SERPL-CCNC: 83 U/L (ref 24–173)
CLARITY UR: CLEAR
CO2 SERPL-SCNC: 27.3 MMOL/L (ref 24.3–31.9)
COLOR UR: YELLOW
CREAT BLD-MCNC: 0.74 MG/DL (ref 0.43–1.29)
DEPRECATED RDW RBC AUTO: 39.9 FL (ref 37–54)
EOSINOPHIL # BLD AUTO: 0.29 10*3/MM3 (ref 0–0.7)
EOSINOPHIL NFR BLD AUTO: 4.1 % (ref 0–7)
ERYTHROCYTE [DISTWIDTH] IN BLOOD BY AUTOMATED COUNT: 12.6 % (ref 11.5–14.5)
GFR SERPL CREATININE-BSD FRML MDRD: 78 ML/MIN/1.73
GLOBULIN UR ELPH-MCNC: 3 GM/DL
GLUCOSE BLD-MCNC: 200 MG/DL (ref 70–110)
GLUCOSE UR STRIP-MCNC: NEGATIVE MG/DL
HCT VFR BLD AUTO: 39.6 % (ref 37–47)
HGB BLD-MCNC: 13.7 G/DL (ref 12–16)
HGB UR QL STRIP.AUTO: NEGATIVE
IMM GRANULOCYTES # BLD: 0.01 10*3/MM3 (ref 0–0.03)
IMM GRANULOCYTES NFR BLD: 0.1 % (ref 0–0.5)
KETONES UR QL STRIP: NEGATIVE
LEUKOCYTE ESTERASE UR QL STRIP.AUTO: NEGATIVE
LYMPHOCYTES # BLD AUTO: 2.41 10*3/MM3 (ref 1–3)
LYMPHOCYTES NFR BLD AUTO: 34 % (ref 16–46)
MCH RBC QN AUTO: 30.4 PG (ref 27–33)
MCHC RBC AUTO-ENTMCNC: 34.6 G/DL (ref 33–37)
MCV RBC AUTO: 88 FL (ref 80–94)
MONOCYTES # BLD AUTO: 0.74 10*3/MM3 (ref 0.1–0.9)
MONOCYTES NFR BLD AUTO: 10.5 % (ref 0–12)
NEUTROPHILS # BLD AUTO: 3.61 10*3/MM3 (ref 1.4–6.5)
NEUTROPHILS NFR BLD AUTO: 51 % (ref 40–75)
NITRITE UR QL STRIP: NEGATIVE
OSMOLALITY SERPL CALC.SUM OF ELEC: 284.4 MOSM/KG (ref 273–305)
PH UR STRIP.AUTO: 7.5 [PH] (ref 5–8)
PLATELET # BLD AUTO: 312 10*3/MM3 (ref 130–400)
PMV BLD AUTO: 9.9 FL (ref 6–10)
POTASSIUM BLD-SCNC: 4 MMOL/L (ref 3.5–5.3)
PROT SERPL-MCNC: 7.2 G/DL (ref 6–8)
PROT UR QL STRIP: NEGATIVE
RBC # BLD AUTO: 4.5 10*6/MM3 (ref 4.2–5.4)
SODIUM BLD-SCNC: 139 MMOL/L (ref 135–153)
SP GR UR STRIP: 1.01 (ref 1–1.03)
TROPONIN I SERPL-MCNC: <0.006 NG/ML
UROBILINOGEN UR QL STRIP: NORMAL
WBC NRBC COR # BLD: 7.08 10*3/MM3 (ref 4.5–12.5)

## 2018-04-04 PROCEDURE — 82553 CREATINE MB FRACTION: CPT | Performed by: EMERGENCY MEDICINE

## 2018-04-04 PROCEDURE — 85025 COMPLETE CBC W/AUTO DIFF WBC: CPT | Performed by: EMERGENCY MEDICINE

## 2018-04-04 PROCEDURE — 80053 COMPREHEN METABOLIC PANEL: CPT | Performed by: EMERGENCY MEDICINE

## 2018-04-04 PROCEDURE — 93005 ELECTROCARDIOGRAM TRACING: CPT | Performed by: EMERGENCY MEDICINE

## 2018-04-04 PROCEDURE — 82550 ASSAY OF CK (CPK): CPT | Performed by: EMERGENCY MEDICINE

## 2018-04-04 PROCEDURE — 81003 URINALYSIS AUTO W/O SCOPE: CPT | Performed by: EMERGENCY MEDICINE

## 2018-04-04 PROCEDURE — 70450 CT HEAD/BRAIN W/O DYE: CPT

## 2018-04-04 PROCEDURE — 70450 CT HEAD/BRAIN W/O DYE: CPT | Performed by: RADIOLOGY

## 2018-04-04 PROCEDURE — 84484 ASSAY OF TROPONIN QUANT: CPT | Performed by: EMERGENCY MEDICINE

## 2018-04-04 PROCEDURE — 99285 EMERGENCY DEPT VISIT HI MDM: CPT

## 2018-04-04 PROCEDURE — 93010 ELECTROCARDIOGRAM REPORT: CPT | Performed by: INTERNAL MEDICINE

## 2018-04-04 RX ORDER — LORAZEPAM 2 MG/ML
0.5 INJECTION INTRAMUSCULAR ONCE
Status: DISCONTINUED | OUTPATIENT
Start: 2018-04-04 | End: 2018-04-04

## 2018-04-04 RX ORDER — ALBUTEROL SULFATE 2.5 MG/3ML
2.5 SOLUTION RESPIRATORY (INHALATION) ONCE
Status: DISCONTINUED | OUTPATIENT
Start: 2018-04-04 | End: 2018-04-04

## 2018-04-04 RX ORDER — CLONIDINE HYDROCHLORIDE 0.2 MG/1
0.2 TABLET ORAL 2 TIMES DAILY PRN
Qty: 20 TABLET | Refills: 0 | Status: SHIPPED | OUTPATIENT
Start: 2018-04-04 | End: 2019-06-24

## 2018-04-04 RX ORDER — SODIUM CHLORIDE 0.9 % (FLUSH) 0.9 %
10 SYRINGE (ML) INJECTION AS NEEDED
Status: DISCONTINUED | OUTPATIENT
Start: 2018-04-04 | End: 2018-04-05 | Stop reason: HOSPADM

## 2018-04-04 RX ORDER — METFORMIN HYDROCHLORIDE 500 MG/1
1000 TABLET, EXTENDED RELEASE ORAL 2 TIMES DAILY
COMMUNITY

## 2018-04-04 RX ORDER — CLONIDINE HYDROCHLORIDE 0.1 MG/1
0.1 TABLET ORAL ONCE
Status: COMPLETED | OUTPATIENT
Start: 2018-04-04 | End: 2018-04-04

## 2018-04-04 RX ORDER — BUMETANIDE 0.25 MG/ML
1 INJECTION INTRAMUSCULAR; INTRAVENOUS ONCE
Status: DISCONTINUED | OUTPATIENT
Start: 2018-04-04 | End: 2018-04-04

## 2018-04-04 RX ADMIN — CLONIDINE HYDROCHLORIDE 0.1 MG: 0.1 TABLET ORAL at 20:34

## 2018-04-05 NOTE — ED NOTES
Patient presents to the ER tonight from home due to high blood pressure. Patient states that she believes her blood pressure has been running high for a few weeks, but states that she checked it today and states that it was running 180/90. Patient states that she seen Dr. Lemus this morning, reports that her blood pressure was slightly elevated, but states that he was not concerned and sent her home. Patient states when she got home she rechecked her blood pressure and it was 209/109. Patient states she took an extra blood pressure pill, states that tonight when she went to take her night time medication she checked her blood pressure medication again, states that she checked her blood pressure again and it was high so she decided to come to the ER.     Og Jimenez RN  04/05/18 [id number]

## 2018-04-05 NOTE — ED PROVIDER NOTES
Subjective   Pt comes in with concern for hypertension.  Has had elevated but fluctuating BP all day.  Saw Dr Lemus this morning but the office was not concerned, which made her mad.  Her blood pressure remained high the rest of the afternoon.  Pt reports /109 at home.  Pt has felt funny in her head and had heart racing.  Pt has taken both of her routine doses of BP med plus one extra dose        Hypertension   Severity:  Moderate  Duration: All day.  Timing:  Intermittent  Progression since onset: Waxing and waning.  Chronicity:  Recurrent  Notable PTA blood pressures:  209/109  Context: normal sodium, not caffeine, not drug abuse, not herbal remedies, not medication change, not noncompliance, not OTC medications used and not stress    Relieved by:  Angiotensin blockers  Worsened by:  Nothing  Ineffective treatments:  None tried  Associated symptoms: anxiety, dizziness, fatigue, palpitations and weakness    Associated symptoms: no abdominal pain, no blurred vision, no chest pain, no confusion, no ear pain, no epistaxis, no fever, no headaches, no hematuria, no hypokalemia, no loss of consciousness, no nausea, no neck pain, no peripheral edema, no shortness of breath, no syncope, no tinnitus and not vomiting    Risk factors: cardiac disease, diabetes and obesity    Risk factors: no alcohol use, no cocaine use, no decongestant use, no kidney disease, no prior aneurysm, no prior stroke, no PVD and no tobacco use        Review of Systems   Constitutional: Positive for fatigue. Negative for fever.   HENT: Negative.  Negative for ear pain, nosebleeds and tinnitus.    Eyes: Negative.  Negative for blurred vision.   Respiratory: Negative.  Negative for chest tightness and shortness of breath.    Cardiovascular: Positive for palpitations. Negative for chest pain and syncope.   Gastrointestinal: Negative.  Negative for abdominal pain, nausea and vomiting.   Endocrine: Negative.    Genitourinary: Negative.  Negative  for hematuria.   Musculoskeletal: Negative.  Negative for neck pain.   Skin: Negative.    Allergic/Immunologic: Negative.    Neurological: Positive for dizziness and weakness. Negative for loss of consciousness and headaches.   Hematological: Negative.    Psychiatric/Behavioral: Negative for confusion. The patient is nervous/anxious.    All other systems reviewed and are negative.      Past Medical History:   Diagnosis Date   • Arthritis    • DMII (diabetes mellitus, type 2)    • Dyslipidemia    • GERD (gastroesophageal reflux disease)    • HTN (hypertension)    • Hyperlipidemia    • Type 1 diabetes mellitus 12/20/2017   • Type 2 diabetes mellitus with complication 12/20/2017       No Known Allergies    Past Surgical History:   Procedure Laterality Date   • BLADDER SUSPENSION     • CATARACT EXTRACTION W/ INTRAOCULAR LENS  IMPLANT, BILATERAL     • CHOLECYSTECTOMY  11/04/2015    laparoscopic   • COLONOSCOPY  03/30/2009   • COLONOSCOPY N/A 12/1/2017    Procedure: COLONOSCOPY FOR SCREENING CPT CODE: ;  Surgeon: Deshawn Ibanez III, MD;  Location: [facility];  Service:    • ENDOSCOPY  02/2002   • ENDOSCOPY N/A 12/1/2017    Procedure: ESOPHAGOGASTRODUODENOSCOPY WITH BIOPSY CPT CODE: 58456;  Surgeon: Deshawn Ibanez III, MD;  Location: [facility];  Service:    • KNEE SURGERY Bilateral    • PARTIAL HYSTERECTOMY     • VARICOSE VEIN SURGERY         Family History   Problem Relation Age of Onset   • Coronary artery disease Mother    • Coronary artery disease Father 69     fatal MI age 69   • Heart attack Father    • Heart defect Sister      pacemaker   • Liver cancer Sister    • Heart attack Brother 29     s/p CAGB   • Coronary artery disease Brother      fatal MI, age 50   • Heart attack Brother 39       Social History     Social History   • Marital status:      Social History Main Topics   • Smoking status: Current Every Day Smoker     Packs/day: 0.50     Years: 25.00     Types: Cigarettes   • Smokeless  tobacco: Never Used   • Alcohol use No   • Drug use: No   • Sexual activity: Defer     Other Topics Concern   • Not on file           Objective   Physical Exam   Constitutional: She is oriented to person, place, and time. She appears well-developed and well-nourished. No distress.   HENT:   Head: Normocephalic and atraumatic.   Nose: Nose normal.   Pasty mucus membranes   Eyes: Conjunctivae and EOM are normal. Pupils are equal, round, and reactive to light. Right eye exhibits no discharge. Left eye exhibits no discharge. No scleral icterus.   Neck: Normal range of motion. Neck supple. No JVD present. No tracheal deviation present. No thyromegaly present.   Cardiovascular: Normal rate, regular rhythm, normal heart sounds and intact distal pulses.  Exam reveals no gallop and no friction rub.    No murmur heard.  Pulmonary/Chest: Effort normal and breath sounds normal. No stridor. No respiratory distress. She has no wheezes. She has no rales.   Abdominal: Soft. She exhibits no distension and no mass. There is no tenderness. There is no guarding.   Genitourinary:   Genitourinary Comments: No CVAT   Musculoskeletal: Normal range of motion. She exhibits no deformity.   Lymphadenopathy:     She has no cervical adenopathy.   Neurological: She is alert and oriented to person, place, and time. No cranial nerve deficit. She exhibits normal muscle tone. Coordination normal.   Skin: Skin is warm and dry. No pallor.   Psychiatric:   Anxious    Nursing note and vitals reviewed.      Procedures         ED Course  ED Course   Value Comment By Time   ECG 12 Lead 12-lead EKG performed at 2051 hrs.  Interpreted by me at 2053 hrs.  Normal sinus rhythm.  Ventricular rate 68.  KY interval 158.  QRS duration 76.  .  QTc 425.  No pathologic blocks.  No sustained ectopy or dysrhythmia.  No acute ischemic ST segment deviation.  No pathologic T-wave changes.  No criteria for STEMI. Alec Donovan MD 04/04 3844    Patient  hemodynamically stable.  Negative EKG and cardiac enzymes.  Blood pressure mildly elevated but decreased.  CT of the head also negative.  Patient neurologically intact. Alec Donovan MD 04/04 2206                  MDM  Number of Diagnoses or Management Options  Hypertensive urgency: new and requires workup     Amount and/or Complexity of Data Reviewed  Clinical lab tests: ordered and reviewed  Tests in the radiology section of CPT®: ordered and reviewed  Independent visualization of images, tracings, or specimens: yes    Risk of Complications, Morbidity, and/or Mortality  Presenting problems: high  Diagnostic procedures: high  Management options: moderate    Patient Progress  Patient progress: stable      Final diagnoses:   Hypertensive urgency            Alec Donovan MD  04/04/18 1130

## 2018-04-09 ENCOUNTER — OFFICE VISIT (OUTPATIENT)
Dept: CARDIOLOGY | Facility: CLINIC | Age: 68
End: 2018-04-09

## 2018-04-09 VITALS
HEART RATE: 72 BPM | HEIGHT: 62 IN | DIASTOLIC BLOOD PRESSURE: 82 MMHG | WEIGHT: 169 LBS | BODY MASS INDEX: 31.1 KG/M2 | RESPIRATION RATE: 16 BRPM | SYSTOLIC BLOOD PRESSURE: 140 MMHG

## 2018-04-09 DIAGNOSIS — E11.9 TYPE 2 DIABETES MELLITUS WITHOUT COMPLICATION, WITHOUT LONG-TERM CURRENT USE OF INSULIN (HCC): ICD-10-CM

## 2018-04-09 DIAGNOSIS — R60.0 BILATERAL LEG EDEMA: Primary | ICD-10-CM

## 2018-04-09 DIAGNOSIS — E78.5 DYSLIPIDEMIA: ICD-10-CM

## 2018-04-09 PROCEDURE — 99213 OFFICE O/P EST LOW 20 MIN: CPT | Performed by: INTERNAL MEDICINE

## 2018-04-09 RX ORDER — AMLODIPINE BESYLATE 5 MG/1
5 TABLET ORAL DAILY
COMMUNITY

## 2018-04-09 RX ORDER — POLYETHYLENE GLYCOL 3350 17 G/17G
17 POWDER, FOR SOLUTION ORAL 2 TIMES DAILY
COMMUNITY
End: 2020-08-05 | Stop reason: ALTCHOICE

## 2018-04-09 RX ORDER — ATORVASTATIN CALCIUM 40 MG/1
40 TABLET, FILM COATED ORAL DAILY
COMMUNITY

## 2018-04-09 NOTE — PROGRESS NOTES
Kendrick Lemus MD  Valerie Stroud  1950 04/09/2018    Patient Active Problem List   Diagnosis   • Essential hypertension   • Dyslipidemia   • Dyspnea, unspecified   • Early satiety   • Dyspepsia   • Epigastric abdominal pain   • RUQ abdominal pain   • Weight gain, abnormal   • History of colon polyps   • Constipation   • Type 2 diabetes mellitus without complication   • Bilateral leg edema with no clinical signs of heart failure.       Dear Kendrick Lemus MD:    Jeanine Stroud is a 68 y.o. female with the problems as listed above, presents    Complaint: Follow-up of leg edema and he of recent results.      History of Present Illness: Michael Stroud is a pleasant 68-year-old  female with no history of known coronary artery disease, was recently seen for complaints of bilateral leg edema.  She had a BNP level in December 2017 that was normal at 43.  Her echo Doppler study recently revealed normal wall motion and systolic function with no significant valvular abnormalities noted.  Her blood pressure has been running high lately for which she went to the emergency department at [place].  Her blood pressure medications were recently adjusted by Dr. Lemus and she was asked to take amlodipine 5 mg daily and continue with carvedilol 12.5 g by mouth twice a day.  She is here for follow-up of her echo Doppler study results.  She denies any complaints of shortness of breath.  She has no history of DVT.  She denies any chest pains.  She denies consuming salt rich foods such as canned vegetables, soups, processed meats pickles etc.    No Known Allergies:      Current Outpatient Prescriptions:   •  amLODIPine (NORVASC) 5 MG tablet, Take 5 mg by mouth Daily., Disp: , Rfl:   •  aspirin 81 MG EC tablet, Take 81 mg by mouth daily., Disp: , Rfl:   •  atorvastatin (LIPITOR) 40 MG tablet, Take 40 mg by mouth Daily., Disp: , Rfl:   •  carvedilol (COREG) 12.5 MG tablet, TAKE ONE TABLET BY MOUTH  TWO TIMES A DAY FOR THE HEART OR BLOOD PRESSURE, Disp: 180 tablet, Rfl: 2  •  CloNIDine (CATAPRES) 0.2 MG tablet, Take 1 tablet by mouth 2 (Two) Times a Day As Needed (Uncontrolled blood pressure). (Patient taking differently: Take 0.2 mg by mouth As Needed (Uncontrolled blood pressure).), Disp: 20 tablet, Rfl: 0  •  glimepiride (AMARYL) 1 MG tablet, Take 4 mg by mouth Every Morning Before Breakfast., Disp: , Rfl:   •  metFORMIN ER (GLUCOPHAGE-XR) 500 MG 24 hr tablet, Take 500 mg by mouth 2 (Two) Times a Day., Disp: , Rfl:   •  omeprazole (priLOSEC) 40 MG capsule, Take 40 mg by mouth Daily., Disp: , Rfl:   •  polyethylene glycol (MIRALAX) packet, Take 17 g by mouth 2 (Two) Times a Day., Disp: , Rfl:   •  QUEtiapine (SEROquel) 200 MG tablet, Take 200 mg by mouth every night., Disp: , Rfl:   •  traMADol (ULTRAM) 50 MG tablet, Take 50 mg by mouth 2 (two) times a day., Disp: , Rfl:   •  docusate sodium (COLACE) 100 MG capsule, Take 100 mg by mouth 2 (two) times a day., Disp: , Rfl:   •  furosemide (LASIX) 20 MG tablet, Take 1 tablet by mouth Daily., Disp: 180 tablet, Rfl: 1  •  gabapentin (NEURONTIN) 300 MG capsule, Take 300 mg by mouth 3 (Three) Times a Day., Disp: , Rfl:   •  hydrOXYzine (VISTARIL) 25 MG capsule, Take 25 mg by mouth 2 (two) times a day., Disp: , Rfl:       The following portions of the patient's history were reviewed and updated as appropriate: allergies, current medications, past family history, past medical history, past social history, past surgical history and problem list.    Social History   Substance Use Topics   • Smoking status: Current Every Day Smoker     Packs/day: 0.50     Years: 25.00     Types: Cigarettes   • Smokeless tobacco: Never Used   • Alcohol use No       Review of Systems   Constitution: Negative for chills and fever.   HENT: Negative for nosebleeds and sore throat.    Cardiovascular: Positive for leg swelling. Negative for chest pain and palpitations.   Respiratory: Negative  "for cough, hemoptysis, shortness of breath and wheezing.    Gastrointestinal: Negative for abdominal pain, hematemesis, hematochezia, melena, nausea and vomiting.   Genitourinary: Negative for dysuria and hematuria.   Neurological: Negative for headaches.       Objective   Vitals:    04/09/18 0839   BP: 140/82   Pulse: 72   Resp: 16   Weight: 76.7 kg (169 lb)   Height: 157.5 cm (62\")     Body mass index is 30.91 kg/m².        Physical Exam   Constitutional: She is oriented to person, place, and time. She appears well-developed and well-nourished.   HENT:   Mouth/Throat: Oropharynx is clear and moist.   Eyes: EOM are normal. Pupils are equal, round, and reactive to light.   Neck: Neck supple. No JVD present. No tracheal deviation present. No thyromegaly present.   Cardiovascular: Normal rate, regular rhythm, S1 normal and S2 normal.  Exam reveals no gallop, no S3, no S4 and no friction rub.    No murmur heard.  Pulmonary/Chest: Effort normal and breath sounds normal.   Abdominal: Soft. Bowel sounds are normal. She exhibits no mass. There is no tenderness.   Musculoskeletal: Normal range of motion. Edema:  1 + edema on both legs.   Lymphadenopathy:     She has no cervical adenopathy.   Neurological: She is alert and oriented to person, place, and time.   Skin: Skin is warm and dry. No rash noted.   Psychiatric: She has a normal mood and affect.       Lab Results   Component Value Date     04/04/2018    K 4.0 04/04/2018     04/04/2018    CO2 27.3 04/04/2018    BUN 16 04/04/2018    CREATININE 0.74 04/04/2018    GLUCOSE 200 (H) 04/04/2018    CALCIUM 10.1 (H) 04/04/2018    AST 24 04/04/2018    ALT 24 04/04/2018    ALKPHOS 94 04/04/2018    LABIL2 1.4 (L) 04/04/2018     Lab Results   Component Value Date    CKTOTAL 83 04/04/2018     Lab Results   Component Value Date    WBC 7.08 04/04/2018    HGB 13.7 04/04/2018    HCT 39.6 04/04/2018     04/04/2018     No results found for: INR  No results found for: " MG  Lab Results   Component Value Date    TSH 3.293 10/24/2017            Valerie Stroud   Echocardiogram   Order# [id number]   Reading physician: Christ Gresham MD Ordering physician: Christ Gresham MD Study date: 17   Patient Information   Patient Name  Valerie Stroud MRN  [id number] Sex  Female  (Age)  1950 (68 y.o.)   Interpretation Summary     · Left ventricular diastolic dysfunction (grade I) consistent with impaired relaxation.  · Normal left ventricular cavity size and wall thickness noted. All left ventricular wall segments contract normally.  · Estimated EF appears to be in the range of 56 - 60%.  · The aortic valve is structurally normal. No aortic valve regurgitation is present. No aortic valve stenosis is present.  · The mitral valve is normal in structure. Trace mitral valve regurgitation is present. No significant mitral valve stenosis is present.  · The tricuspid valve is normal. No tricuspid valve stenosis is present. No tricuspid valve regurgitation is present. Estimated right ventricular systolic pressure from tricuspid regurgitation is normal (<35 mmHg).  · There is no evidence of pericardial effusion.          Procedures    Assessment/Plan    Diagnosis Plan   1. Bilateral leg edema with no clinical signs of heart failure.     2. Type 2 diabetes mellitus without complication, without long-term current use of insulin     3. Dyslipidemia          Recommendations:  1. I have reviewed the echo Doppler results with the patient.  2. Since her blood pressure is doing better with current medications, would continue with these and perhaps consider adding an ACE inhibitor or ARB again later due to her history of long-standing diabetes mellitus..  3. Follow-up in 6 months.    Return in about 6 months (around 10/9/2018).    As always, I appreciate very much the opportunity to participate in the cardiovascular care of your patients.      With Best Regards,    Christ Gresham MD, [facility]    Domitila  disclaimer:  Much of this encounter note is an electronic transcription/translation of spoken language to printed text. The electronic translation of spoken language may permit erroneous, or at times, nonsensical words or phrases to be inadvertently transcribed; Although I have reviewed the note for such errors, some may still exist.

## 2018-06-19 NOTE — PROGRESS NOTES
-Per Deyanira (see result note 12/7/17 on labs), pt to have repeat BMP in 1 week.

## 2018-07-20 ENCOUNTER — TELEPHONE (OUTPATIENT)
Dept: CARDIOLOGY | Facility: CLINIC | Age: 68
End: 2018-07-20

## 2018-07-20 RX ORDER — CARVEDILOL 12.5 MG/1
TABLET ORAL
Qty: 180 TABLET | Refills: 0 | Status: SHIPPED | OUTPATIENT
Start: 2018-07-20

## 2019-05-08 ENCOUNTER — OFFICE VISIT (OUTPATIENT)
Dept: CARDIOLOGY | Facility: CLINIC | Age: 69
End: 2019-05-08

## 2019-05-08 VITALS
DIASTOLIC BLOOD PRESSURE: 78 MMHG | WEIGHT: 165 LBS | HEART RATE: 72 BPM | RESPIRATION RATE: 16 BRPM | BODY MASS INDEX: 30.36 KG/M2 | HEIGHT: 62 IN | SYSTOLIC BLOOD PRESSURE: 131 MMHG

## 2019-05-08 DIAGNOSIS — R00.2 PALPITATIONS: Primary | ICD-10-CM

## 2019-05-08 DIAGNOSIS — R00.2 PALPITATION: ICD-10-CM

## 2019-05-08 DIAGNOSIS — I10 ESSENTIAL HYPERTENSION: ICD-10-CM

## 2019-05-08 DIAGNOSIS — E78.5 DYSLIPIDEMIA: ICD-10-CM

## 2019-05-08 PROCEDURE — 93000 ELECTROCARDIOGRAM COMPLETE: CPT | Performed by: PHYSICIAN ASSISTANT

## 2019-05-08 PROCEDURE — 99214 OFFICE O/P EST MOD 30 MIN: CPT | Performed by: PHYSICIAN ASSISTANT

## 2019-05-08 RX ORDER — LOSARTAN POTASSIUM 50 MG/1
75 TABLET ORAL NIGHTLY
COMMUNITY

## 2019-05-08 NOTE — PROGRESS NOTES
Kendrick Lemus MD  Valerie Stroud  1950 05/08/2019    Patient Active Problem List   Diagnosis   • Essential hypertension   • Dyslipidemia   • Dyspnea, unspecified   • Early satiety   • Dyspepsia   • Epigastric abdominal pain   • RUQ abdominal pain   • Weight gain, abnormal   • History of colon polyps   • Constipation   • Type 2 diabetes mellitus without complication (CMS/HCC)   • Bilateral leg edema with no clinical signs of heart failure.   • Palpitation       Dear Kendrick Lemus MD:    Subjective     History of Present Illness:    Chief Compliant: Follow up for essential hypertension    Valerie Stroud is a pleasant 69 y.o. female with a past medical history significant for essential hypertension, dyslipidemia, history of pedal edema.  She comes in today for her annual routine cardiology follow-up.    Patient reports that she has been doing well.  She does report to me that she walks daily roughly 3 miles every day.  She also tells me that she recently acquired a fit bit which is an activity tracker on her wrist and she is slightly concerned that it has been telling her that her heart rate has been running fast in the mornings when she wakes up.  She reports to me that her heart rate can run around 110-115 bpm most mornings.  Does deny any symptoms when this is happening such as palpitations, dizziness, or syncope or near syncope.  Denies any chest pains or shortness of breath.    No Known Allergies:      Current Outpatient Medications:   •  amLODIPine (NORVASC) 5 MG tablet, Take 5 mg by mouth Daily., Disp: , Rfl:   •  aspirin 81 MG EC tablet, Take 81 mg by mouth daily., Disp: , Rfl:   •  atorvastatin (LIPITOR) 40 MG tablet, Take 40 mg by mouth Daily., Disp: , Rfl:   •  carvedilol (COREG) 12.5 MG tablet, TAKE ONE TABLET BY MOUTH TWO TIMES A DAY FOR THE HEART OR BLOOD PRESSURE, Disp: 180 tablet, Rfl: 0  •  exenatide er (BYDUREON) 2 MG pen-injector injection, Inject 2 mg under the skin into  the appropriate area as directed 1 (One) Time Per Week., Disp: , Rfl:   •  furosemide (LASIX) 20 MG tablet, Take 1 tablet by mouth Daily. (Patient taking differently: Take 20 mg by mouth As Needed.), Disp: 180 tablet, Rfl: 1  •  glimepiride (AMARYL) 1 MG tablet, Take 4 mg by mouth Every Morning Before Breakfast., Disp: , Rfl:   •  hydrOXYzine (VISTARIL) 25 MG capsule, Take 25 mg by mouth 2 (two) times a day., Disp: , Rfl:   •  losartan (COZAAR) 50 MG tablet, Take 75 mg by mouth Every Night. Takes 1 & 1/2 of 50mg, Disp: , Rfl:   •  metFORMIN ER (GLUCOPHAGE-XR) 500 MG 24 hr tablet, Take 1,000 mg by mouth 2 (Two) Times a Day., Disp: , Rfl:   •  omeprazole (priLOSEC) 40 MG capsule, Take 40 mg by mouth Daily., Disp: , Rfl:   •  polyethylene glycol (MIRALAX) packet, Take 17 g by mouth 2 (Two) Times a Day., Disp: , Rfl:   •  QUEtiapine (SEROquel) 200 MG tablet, Take 200 mg by mouth every night., Disp: , Rfl:   •  traMADol (ULTRAM) 50 MG tablet, Take 50 mg by mouth 2 (two) times a day., Disp: , Rfl:   •  CloNIDine (CATAPRES) 0.2 MG tablet, Take 1 tablet by mouth 2 (Two) Times a Day As Needed (Uncontrolled blood pressure). (Patient taking differently: Take 0.2 mg by mouth As Needed (Uncontrolled blood pressure).), Disp: 20 tablet, Rfl: 0  •  docusate sodium (COLACE) 100 MG capsule, Take 100 mg by mouth 2 (two) times a day., Disp: , Rfl:     The following portions of the patient's history were reviewed and updated as appropriate: allergies, current medications, past family history, past medical history, past social history, past surgical history and problem list.    Social History     Tobacco Use   • Smoking status: Current Every Day Smoker     Packs/day: 0.50     Years: 25.00     Pack years: 12.50     Types: Cigarettes   • Smokeless tobacco: Never Used   Substance Use Topics   • Alcohol use: No   • Drug use: No       Review of Systems   Constitution: Negative for weakness and malaise/fatigue.   Cardiovascular: Negative for  "chest pain, dyspnea on exertion and irregular heartbeat.   Respiratory: Negative for cough and shortness of breath.    Hematologic/Lymphatic: Negative for bleeding problem. Does not bruise/bleed easily.   Gastrointestinal: Negative for nausea and vomiting.       Objective   Vitals:    05/08/19 1209   BP: 131/78   Pulse: 72   Resp: 16   Weight: 74.8 kg (165 lb)   Height: 157.5 cm (62\")     Body mass index is 30.18 kg/m².    Physical Exam   Constitutional: She is oriented to person, place, and time. She appears well-developed and well-nourished. No distress.   HENT:   Head: Normocephalic and atraumatic.   Cardiovascular: Normal rate, regular rhythm, normal heart sounds and intact distal pulses.   Pulmonary/Chest: Effort normal and breath sounds normal. No respiratory distress.   Musculoskeletal: She exhibits no edema.   Neurological: She is alert and oriented to person, place, and time.   Skin: She is not diaphoretic.       Lab Results   Component Value Date     04/04/2018    K 4.0 04/04/2018     04/04/2018    CO2 27.3 04/04/2018    BUN 16 04/04/2018    CREATININE 0.74 04/04/2018    GLUCOSE 200 (H) 04/04/2018    CALCIUM 10.1 (H) 04/04/2018    AST 24 04/04/2018    ALT 24 04/04/2018    ALKPHOS 94 04/04/2018    LABIL2 1.6 12/19/2015     Lab Results   Component Value Date    CKTOTAL 83 04/04/2018     Lab Results   Component Value Date    WBC 7.08 04/04/2018    HGB 13.7 04/04/2018    HCT 39.6 04/04/2018     04/04/2018     No results found for: INR  No results found for: MG  Lab Results   Component Value Date    TSH 3.293 10/24/2017      No results found for: BNP    During this visit the following were done:  Labs Reviewed [x]    Labs Ordered []    Radiology Reports Reviewed [x]    Radiology Ordered []    PCP Records Reviewed []    Referring Provider Records Reviewed []    ER Records Reviewed []    Hospital Records Reviewed []    History Obtained From Family []    Radiology Images Reviewed []    Other " Reviewed []    Records Requested []         ECG 12 Lead  Date/Time: 5/8/2019 12:37 PM  Performed by: Jayro Marquez PA-C  Authorized by: Jayro Marquez PA-C   Comparison: compared with previous ECG   Similar to previous ECG  Rhythm: sinus rhythm  ST Segments: ST segments normal    Clinical impression: normal ECG            Assessment/Plan    Diagnosis Plan   1. Palpitations  Holter Monitor - 24 Hour   2. Essential hypertension     3. Palpitation     4. Dyslipidemia              Recommendations:  1. Since she does have a activity tracker recording her heart rate showing times of a rapid heart rate of 110 to 115 bpm even though this is a reasonable range since she is 69 years old I will go ahead and order a 24-hour Holter monitor to make sure that it is sinus rhythm.  2. We will also request most recent blood work from PCP.  3. Continue losartan, amlodipine, Lasix, aspirin, carvedilol, and Lipitor.    Return in about 1 year (around 5/8/2020).  Or sooner if monitor shows any dysrhythmias    As always, I appreciate very much the opportunity to participate in the cardiovascular care of your patients.      With Best Regards,    Jayro Maruqez PA-C

## 2019-06-24 ENCOUNTER — HOSPITAL ENCOUNTER (OUTPATIENT)
Dept: GENERAL RADIOLOGY | Facility: HOSPITAL | Age: 69
Discharge: HOME | End: 2019-06-24
Admitting: PHYSICIAN ASSISTANT

## 2019-06-24 ENCOUNTER — OFFICE VISIT (OUTPATIENT)
Dept: GASTROENTEROLOGY | Facility: CLINIC | Age: 69
End: 2019-06-24

## 2019-06-24 VITALS
OXYGEN SATURATION: 96 % | BODY MASS INDEX: 30.84 KG/M2 | SYSTOLIC BLOOD PRESSURE: 130 MMHG | DIASTOLIC BLOOD PRESSURE: 77 MMHG | WEIGHT: 167.6 LBS | HEART RATE: 81 BPM | HEIGHT: 62 IN

## 2019-06-24 DIAGNOSIS — K92.1 BLACK STOOL: ICD-10-CM

## 2019-06-24 DIAGNOSIS — R14.0 BLOATING: ICD-10-CM

## 2019-06-24 DIAGNOSIS — R63.0 POOR APPETITE: ICD-10-CM

## 2019-06-24 DIAGNOSIS — K21.9 GASTROESOPHAGEAL REFLUX DISEASE, ESOPHAGITIS PRESENCE NOT SPECIFIED: Primary | ICD-10-CM

## 2019-06-24 DIAGNOSIS — K59.04 CHRONIC IDIOPATHIC CONSTIPATION: ICD-10-CM

## 2019-06-24 LAB
DEPRECATED RDW RBC AUTO: 42.8 FL (ref 37–54)
ERYTHROCYTE [DISTWIDTH] IN BLOOD BY AUTOMATED COUNT: 12.9 % (ref 12.3–15.4)
HCT VFR BLD AUTO: 41 % (ref 34–46.6)
HGB BLD-MCNC: 13.5 G/DL (ref 12–15.9)
MCH RBC QN AUTO: 29.8 PG (ref 26.6–33)
MCHC RBC AUTO-ENTMCNC: 32.9 G/DL (ref 31.5–35.7)
MCV RBC AUTO: 90.5 FL (ref 79–97)
PLATELET # BLD AUTO: 336 10*3/MM3 (ref 140–450)
PMV BLD AUTO: 11 FL (ref 6–12)
RBC # BLD AUTO: 4.53 10*6/MM3 (ref 3.77–5.28)
WBC NRBC COR # BLD: 6.76 10*3/MM3 (ref 3.4–10.8)

## 2019-06-24 PROCEDURE — 99214 OFFICE O/P EST MOD 30 MIN: CPT | Performed by: PHYSICIAN ASSISTANT

## 2019-06-24 PROCEDURE — 85027 COMPLETE CBC AUTOMATED: CPT | Performed by: PHYSICIAN ASSISTANT

## 2019-06-24 PROCEDURE — 74019 RADEX ABDOMEN 2 VIEWS: CPT | Performed by: RADIOLOGY

## 2019-06-24 PROCEDURE — 36415 COLL VENOUS BLD VENIPUNCTURE: CPT | Performed by: PHYSICIAN ASSISTANT

## 2019-06-24 PROCEDURE — 74019 RADEX ABDOMEN 2 VIEWS: CPT

## 2019-06-24 RX ORDER — DEXLANSOPRAZOLE 60 MG/1
60 CAPSULE, DELAYED RELEASE ORAL DAILY
Qty: 30 CAPSULE | Refills: 5 | Status: SHIPPED | OUTPATIENT
Start: 2019-06-24 | End: 2019-07-15 | Stop reason: SDUPTHER

## 2019-06-24 RX ORDER — LUBIPROSTONE 24 UG/1
24 CAPSULE ORAL 2 TIMES DAILY WITH MEALS
Qty: 60 CAPSULE | Refills: 5 | Status: SHIPPED | OUTPATIENT
Start: 2019-06-24 | End: 2019-06-27

## 2019-06-24 NOTE — PROGRESS NOTES
": 1950    Chief Complaint   Patient presents with   • Abdominal Pain       Valerie Stroud is a 69 y.o. female who presents to the office today as a follow up appointment regarding Abdominal Pain.    History of Present Illness:  Reports abdominal pain which has been severe for the past 2 weeks but has been present for years. She has RUQ abdominal pain that radiates down into her lower abdomen. She has had abdominal pain since her cholecystectomy in 2015 per her report. Does admit constipation for years and took Miralax daily without relief then tried magnesium citrate without help as well. She drank 1/2 bottle and felt some better but abdominal pain did not resolve. She has had decreased appetite and intake for the past 2 weeks. She has not been eating much at all and is feeling weak. She previously walked every day and has not been able to do that recently. Eating seems to make her abdominal pain worse. She drinks protein shakes every day and will eat a spoon of peanut butter which helps her glucose not to drop. Does admit black stools after taking the magnesium citrate but are now normal in color. Has severe bloating normally but this has been worse recently. Was told her thyroid was abnormal but not enough to start a medication.      Has mild intermittent nausea without vomiting. She has acid reflux with bitter taste when swallowing at night and has \"sour stomach\" which radiates upward.    Review of Systems   Constitutional: Negative for chills, fatigue and fever.   HENT: Negative for trouble swallowing.    Eyes: Negative.    Respiratory: Negative for cough, choking, chest tightness and shortness of breath.    Cardiovascular: Negative for chest pain.   Gastrointestinal: Positive for abdominal distention, abdominal pain, constipation and nausea. Negative for anal bleeding, blood in stool, diarrhea and vomiting.   Endocrine: Negative.    Genitourinary: Negative for difficulty urinating.   Musculoskeletal: " Negative for back pain and neck pain.   Skin: Negative.    Allergic/Immunologic: Negative for environmental allergies and food allergies.   Neurological: Negative for dizziness, light-headedness and headaches.   Hematological: Does not bruise/bleed easily.   Psychiatric/Behavioral: Negative.        Past Medical History:   Diagnosis Date   • Arthritis    • DMII (diabetes mellitus, type 2) (CMS/[facility])    • Dyslipidemia    • GERD (gastroesophageal reflux disease)    • HTN (hypertension)    • Hyperlipidemia    • Type 1 diabetes mellitus (CMS/[facility]) 12/20/2017   • Type 2 diabetes mellitus with complication (CMS/[facility]) 12/20/2017       Past Surgical History:   Procedure Laterality Date   • BLADDER SUSPENSION     • CATARACT EXTRACTION W/ INTRAOCULAR LENS  IMPLANT, BILATERAL     • CHOLECYSTECTOMY  11/04/2015    laparoscopic   • COLONOSCOPY  03/30/2009   • COLONOSCOPY N/A 12/1/2017    Procedure: COLONOSCOPY FOR SCREENING CPT CODE: ;  Surgeon: Deshawn Ibanez III, MD;  Location: [facility];  Service:    • ENDOSCOPY  02/2002   • ENDOSCOPY N/A 12/1/2017    Procedure: ESOPHAGOGASTRODUODENOSCOPY WITH BIOPSY CPT CODE: 85353;  Surgeon: Deshawn Ibanez III, MD;  Location: [facility];  Service:    • KNEE SURGERY Bilateral    • PARTIAL HYSTERECTOMY     • VARICOSE VEIN SURGERY         Family History   Problem Relation Age of Onset   • Coronary artery disease Mother    • Coronary artery disease Father 69        fatal MI age 69   • Heart attack Father    • Heart defect Sister         pacemaker   • Liver cancer Sister    • Heart attack Brother 29        s/p CAGB   • Coronary artery disease Brother         fatal MI, age 50   • Heart attack Brother 39       Social History     Socioeconomic History   • Marital status:      Spouse name: Not on file   • Number of children: Not on file   • Years of education: Not on file   • Highest education level: Not on file   Tobacco Use   • Smoking status: Current Every Day Smoker      "Packs/day: 0.50     Years: 25.00     Pack years: 12.50     Types: Cigarettes   • Smokeless tobacco: Never Used   Substance and Sexual Activity   • Alcohol use: No   • Drug use: No   • Sexual activity: Defer       Current Outpatient Medications:   •  amLODIPine (NORVASC) 5 MG tablet, Take 5 mg by mouth Daily., Disp: , Rfl:   •  aspirin 81 MG EC tablet, Take 81 mg by mouth daily., Disp: , Rfl:   •  atorvastatin (LIPITOR) 40 MG tablet, Take 40 mg by mouth Daily., Disp: , Rfl:   •  carvedilol (COREG) 12.5 MG tablet, TAKE ONE TABLET BY MOUTH TWO TIMES A DAY FOR THE HEART OR BLOOD PRESSURE, Disp: 180 tablet, Rfl: 0  •  exenatide er (BYDUREON) 2 MG pen-injector injection, Inject 2 mg under the skin into the appropriate area as directed 1 (One) Time Per Week., Disp: , Rfl:   •  furosemide (LASIX) 20 MG tablet, Take 1 tablet by mouth Daily. (Patient taking differently: Take 20 mg by mouth As Needed.), Disp: 180 tablet, Rfl: 1  •  glimepiride (AMARYL) 1 MG tablet, Take 4 mg by mouth Every Morning Before Breakfast., Disp: , Rfl:   •  hydrOXYzine (VISTARIL) 25 MG capsule, Take 25 mg by mouth 2 (two) times a day., Disp: , Rfl:   •  losartan (COZAAR) 50 MG tablet, Take 75 mg by mouth Every Night. Takes 1 & 1/2 of 50mg, Disp: , Rfl:   •  metFORMIN ER (GLUCOPHAGE-XR) 500 MG 24 hr tablet, Take 1,000 mg by mouth 2 (Two) Times a Day., Disp: , Rfl:   •  omeprazole (priLOSEC) 40 MG capsule, Take 40 mg by mouth Daily., Disp: , Rfl:   •  polyethylene glycol (MIRALAX) packet, Take 17 g by mouth 2 (Two) Times a Day., Disp: , Rfl:   •  QUEtiapine (SEROquel) 200 MG tablet, Take 200 mg by mouth every night., Disp: , Rfl:   •  traMADol (ULTRAM) 50 MG tablet, Take 50 mg by mouth 2 (two) times a day., Disp: , Rfl:     Allergies:   Patient has no known allergies.    Vitals:  /77 (BP Location: Left arm, Patient Position: Sitting, Cuff Size: Adult)   Pulse 81   Ht 157.5 cm (62\")   Wt 76 kg (167 lb 9.6 oz)   SpO2 96%   BMI 30.65 kg/m² "     Physical Exam   Constitutional: She is oriented to person, place, and time. She appears well-developed and well-nourished. No distress.   HENT:   Head: Normocephalic and atraumatic.   Nose: Nose normal.   Mouth/Throat: Oropharynx is clear and moist.   Eyes: Conjunctivae are normal. Right eye exhibits no discharge. Left eye exhibits no discharge. No scleral icterus.   Neck: Normal range of motion. No JVD present.   Cardiovascular: Normal rate, regular rhythm and normal heart sounds. Exam reveals no gallop and no friction rub.   No murmur heard.  Pulmonary/Chest: Effort normal and breath sounds normal. No respiratory distress. She has no wheezes. She has no rales. She exhibits no tenderness.   Abdominal: Soft. Bowel sounds are normal. She exhibits no mass. There is tenderness (generalized).   Musculoskeletal: Normal range of motion. She exhibits no edema or deformity.   Neurological: She is alert and oriented to person, place, and time. Coordination normal.   Skin: Skin is warm and dry. No rash noted. She is not diaphoretic. No erythema.   Psychiatric: She has a normal mood and affect. Her behavior is normal. Judgment and thought content normal.   Vitals reviewed.      Assessment/Plan:  1. Gastroesophageal reflux disease, esophagitis presence not specified    2. Black stool    3. Bloating    4. Poor appetite    5. Chronic idiopathic constipation      Orders Placed This Encounter   Procedures   • XR Abdomen Flat & Upright   • CBC (No Diff)   • Follow Anesthesia Guidelines / Standing Orders   • Obtain Informed Consent     ESOPHAGOGASTRODUODENOSCOPY WITH BIOPSY CPT CODE: 34549 (N/A)  She will need an esophagogastroduodenoscopy performed with IV general sedation. All of the risks, benefits and alternatives of this procedure have been discussed with her, all of her questions have been answered and she has elected to proceed. She should follow up in the office after this procedure to discuss the results and further  recommendations can be made at that time.    New Medications Ordered This Visit   Medications   • dexlansoprazole (DEXILANT) 60 MG capsule     Sig: Take 1 capsule by mouth Daily.     Dispense:  30 capsule     Refill:  5   • lubiprostone (AMITIZA) 24 MCG capsule     Sig: Take 1 capsule by mouth 2 (Two) Times a Day With Meals.     Dispense:  60 capsule     Refill:  5     Discontinue Prilosec. Start Dexilant for GERD as directed. Samples given. Start taking Amitiza 24 mcg for relief of chronic idiopathic constipation. This should be taken as directed; 1 tab PO twice daily with meals. Samples were given at today's visit. Amitiza is a secretory stimulant (chloride-channel activator) used to treat constipation by enhancing a chloride-rich intestinal fluid secretion via activating CIC-2. This increases motility in the intestine and thereby facilitates the passage of stool.           Return in about 2 weeks (around 7/8/2019) for recheck abdominal pain.      Electronically signed 6/24/2019 12:00 PM  Monique Sierra PA-C, [facility]

## 2019-06-25 ENCOUNTER — TELEPHONE (OUTPATIENT)
Dept: GASTROENTEROLOGY | Facility: CLINIC | Age: 69
End: 2019-06-25

## 2019-06-25 NOTE — TELEPHONE ENCOUNTER
Stop Miralax. Take Amitiza 24 mcg BID. If bananas make her feel sick, avoid them, otherwise they are ok.

## 2019-06-25 NOTE — TELEPHONE ENCOUNTER
Patient called and wanted to know if she should continue taking Miralax with her medicine? Also do you think banana's would be ok for her to eat with the way her bowels are?    Thank you

## 2019-06-26 ENCOUNTER — TELEPHONE (OUTPATIENT)
Dept: SURGERY | Facility: CLINIC | Age: 69
End: 2019-06-26

## 2019-06-27 ENCOUNTER — APPOINTMENT (OUTPATIENT)
Dept: CT IMAGING | Facility: HOSPITAL | Age: 69
End: 2019-06-27

## 2019-06-27 ENCOUNTER — TELEPHONE (OUTPATIENT)
Dept: GASTROENTEROLOGY | Facility: CLINIC | Age: 69
End: 2019-06-27

## 2019-06-27 ENCOUNTER — HOSPITAL ENCOUNTER (EMERGENCY)
Facility: HOSPITAL | Age: 69
Discharge: HOME | End: 2019-06-27
Attending: EMERGENCY MEDICINE | Admitting: EMERGENCY MEDICINE

## 2019-06-27 VITALS
WEIGHT: 160 LBS | BODY MASS INDEX: 29.44 KG/M2 | SYSTOLIC BLOOD PRESSURE: 160 MMHG | OXYGEN SATURATION: 95 % | DIASTOLIC BLOOD PRESSURE: 78 MMHG | HEART RATE: 85 BPM | RESPIRATION RATE: 19 BRPM | TEMPERATURE: 98.9 F | HEIGHT: 62 IN

## 2019-06-27 DIAGNOSIS — K59.04 CHRONIC IDIOPATHIC CONSTIPATION: Primary | ICD-10-CM

## 2019-06-27 DIAGNOSIS — K59.00 CONSTIPATION, UNSPECIFIED CONSTIPATION TYPE: ICD-10-CM

## 2019-06-27 DIAGNOSIS — R10.11 RIGHT UPPER QUADRANT ABDOMINAL PAIN: Primary | ICD-10-CM

## 2019-06-27 LAB
ALBUMIN SERPL-MCNC: 4.27 G/DL (ref 3.5–5.2)
ALBUMIN/GLOB SERPL: 1.4 G/DL
ALP SERPL-CCNC: 79 U/L (ref 39–117)
ALT SERPL W P-5'-P-CCNC: 20 U/L (ref 1–33)
AMYLASE SERPL-CCNC: 62 U/L (ref 28–100)
ANION GAP SERPL CALCULATED.3IONS-SCNC: 10.1 MMOL/L (ref 5–15)
AST SERPL-CCNC: 19 U/L (ref 1–32)
BASOPHILS # BLD AUTO: 0.02 10*3/MM3 (ref 0–0.2)
BASOPHILS NFR BLD AUTO: 0.3 % (ref 0–1.5)
BILIRUB SERPL-MCNC: 0.2 MG/DL (ref 0.2–1.2)
BILIRUB UR QL STRIP: NEGATIVE
BUN BLD-MCNC: 23 MG/DL (ref 8–23)
BUN/CREAT SERPL: 37.7 (ref 7–25)
CALCIUM SPEC-SCNC: 9.6 MG/DL (ref 8.6–10.5)
CHLORIDE SERPL-SCNC: 107 MMOL/L (ref 98–107)
CLARITY UR: CLEAR
CO2 SERPL-SCNC: 22.9 MMOL/L (ref 22–29)
COLOR UR: YELLOW
CREAT BLD-MCNC: 0.61 MG/DL (ref 0.57–1)
CRP SERPL-MCNC: 0.08 MG/DL (ref 0–0.5)
DEPRECATED RDW RBC AUTO: 41.1 FL (ref 37–54)
EOSINOPHIL # BLD AUTO: 0.24 10*3/MM3 (ref 0–0.4)
EOSINOPHIL NFR BLD AUTO: 3.2 % (ref 0.3–6.2)
ERYTHROCYTE [DISTWIDTH] IN BLOOD BY AUTOMATED COUNT: 13 % (ref 12.3–15.4)
GFR SERPL CREATININE-BSD FRML MDRD: 97 ML/MIN/1.73
GLOBULIN UR ELPH-MCNC: 3 GM/DL
GLUCOSE BLD-MCNC: 156 MG/DL (ref 65–99)
GLUCOSE UR STRIP-MCNC: NEGATIVE MG/DL
HCT VFR BLD AUTO: 37.8 % (ref 34–46.6)
HGB BLD-MCNC: 12.5 G/DL (ref 12–15.9)
HGB UR QL STRIP.AUTO: NEGATIVE
IMM GRANULOCYTES # BLD AUTO: 0.02 10*3/MM3 (ref 0–0.05)
IMM GRANULOCYTES NFR BLD AUTO: 0.3 % (ref 0–0.5)
KETONES UR QL STRIP: NEGATIVE
LEUKOCYTE ESTERASE UR QL STRIP.AUTO: NEGATIVE
LIPASE SERPL-CCNC: 66 U/L (ref 13–60)
LYMPHOCYTES # BLD AUTO: 2.11 10*3/MM3 (ref 0.7–3.1)
LYMPHOCYTES NFR BLD AUTO: 27.8 % (ref 19.6–45.3)
MAGNESIUM SERPL-MCNC: 2.1 MG/DL (ref 1.6–2.4)
MCH RBC QN AUTO: 29.3 PG (ref 26.6–33)
MCHC RBC AUTO-ENTMCNC: 33.1 G/DL (ref 31.5–35.7)
MCV RBC AUTO: 88.5 FL (ref 79–97)
MONOCYTES # BLD AUTO: 0.72 10*3/MM3 (ref 0.1–0.9)
MONOCYTES NFR BLD AUTO: 9.5 % (ref 5–12)
NEUTROPHILS # BLD AUTO: 4.47 10*3/MM3 (ref 1.7–7)
NEUTROPHILS NFR BLD AUTO: 58.9 % (ref 42.7–76)
NITRITE UR QL STRIP: NEGATIVE
PH UR STRIP.AUTO: 6.5 [PH] (ref 5–8)
PLATELET # BLD AUTO: 294 10*3/MM3 (ref 140–450)
PMV BLD AUTO: 9.8 FL (ref 6–12)
POTASSIUM BLD-SCNC: 4.2 MMOL/L (ref 3.5–5.2)
PROT SERPL-MCNC: 7.3 G/DL (ref 6–8.5)
PROT UR QL STRIP: NEGATIVE
RBC # BLD AUTO: 4.27 10*6/MM3 (ref 3.77–5.28)
SODIUM BLD-SCNC: 140 MMOL/L (ref 136–145)
SP GR UR STRIP: 1.01 (ref 1–1.03)
TROPONIN T SERPL-MCNC: <0.01 NG/ML (ref 0–0.03)
UROBILINOGEN UR QL STRIP: NORMAL
WBC NRBC COR # BLD: 7.58 10*3/MM3 (ref 3.4–10.8)

## 2019-06-27 PROCEDURE — 96374 THER/PROPH/DIAG INJ IV PUSH: CPT

## 2019-06-27 PROCEDURE — 81003 URINALYSIS AUTO W/O SCOPE: CPT | Performed by: PHYSICIAN ASSISTANT

## 2019-06-27 PROCEDURE — 99284 EMERGENCY DEPT VISIT MOD MDM: CPT

## 2019-06-27 PROCEDURE — 83690 ASSAY OF LIPASE: CPT | Performed by: PHYSICIAN ASSISTANT

## 2019-06-27 PROCEDURE — 25010000002 MORPHINE PER 10 MG: Performed by: EMERGENCY MEDICINE

## 2019-06-27 PROCEDURE — 74176 CT ABD & PELVIS W/O CONTRAST: CPT

## 2019-06-27 PROCEDURE — 74176 CT ABD & PELVIS W/O CONTRAST: CPT | Performed by: RADIOLOGY

## 2019-06-27 PROCEDURE — 96375 TX/PRO/DX INJ NEW DRUG ADDON: CPT

## 2019-06-27 PROCEDURE — 93010 ELECTROCARDIOGRAM REPORT: CPT | Performed by: INTERNAL MEDICINE

## 2019-06-27 PROCEDURE — 25010000002 ONDANSETRON PER 1 MG: Performed by: PHYSICIAN ASSISTANT

## 2019-06-27 PROCEDURE — 80053 COMPREHEN METABOLIC PANEL: CPT | Performed by: PHYSICIAN ASSISTANT

## 2019-06-27 PROCEDURE — 85025 COMPLETE CBC W/AUTO DIFF WBC: CPT | Performed by: PHYSICIAN ASSISTANT

## 2019-06-27 PROCEDURE — 96361 HYDRATE IV INFUSION ADD-ON: CPT

## 2019-06-27 PROCEDURE — 86140 C-REACTIVE PROTEIN: CPT | Performed by: PHYSICIAN ASSISTANT

## 2019-06-27 PROCEDURE — 84484 ASSAY OF TROPONIN QUANT: CPT | Performed by: PHYSICIAN ASSISTANT

## 2019-06-27 PROCEDURE — 82150 ASSAY OF AMYLASE: CPT | Performed by: PHYSICIAN ASSISTANT

## 2019-06-27 PROCEDURE — 93005 ELECTROCARDIOGRAM TRACING: CPT | Performed by: PHYSICIAN ASSISTANT

## 2019-06-27 PROCEDURE — 83735 ASSAY OF MAGNESIUM: CPT | Performed by: PHYSICIAN ASSISTANT

## 2019-06-27 RX ORDER — SODIUM CHLORIDE 0.9 % (FLUSH) 0.9 %
10 SYRINGE (ML) INJECTION AS NEEDED
Status: DISCONTINUED | OUTPATIENT
Start: 2019-06-27 | End: 2019-06-27 | Stop reason: HOSPADM

## 2019-06-27 RX ORDER — ONDANSETRON 2 MG/ML
4 INJECTION INTRAMUSCULAR; INTRAVENOUS ONCE
Status: COMPLETED | OUTPATIENT
Start: 2019-06-27 | End: 2019-06-27

## 2019-06-27 RX ORDER — SODIUM CHLORIDE 9 MG/ML
125 INJECTION, SOLUTION INTRAVENOUS CONTINUOUS
Status: DISCONTINUED | OUTPATIENT
Start: 2019-06-27 | End: 2019-06-27 | Stop reason: HOSPADM

## 2019-06-27 RX ORDER — MORPHINE SULFATE 2 MG/ML
2 INJECTION, SOLUTION INTRAMUSCULAR; INTRAVENOUS ONCE
Status: COMPLETED | OUTPATIENT
Start: 2019-06-27 | End: 2019-06-27

## 2019-06-27 RX ADMIN — SODIUM CHLORIDE 125 ML/HR: 9 INJECTION, SOLUTION INTRAVENOUS at 18:07

## 2019-06-27 RX ADMIN — MORPHINE SULFATE 2 MG: 2 INJECTION, SOLUTION INTRAMUSCULAR; INTRAVENOUS at 18:09

## 2019-06-27 RX ADMIN — ONDANSETRON 4 MG: 2 INJECTION, SOLUTION INTRAMUSCULAR; INTRAVENOUS at 18:07

## 2019-07-08 PROBLEM — K92.1 BLACK STOOL: Status: ACTIVE | Noted: 2019-07-08

## 2019-07-08 PROBLEM — K21.9 GASTROESOPHAGEAL REFLUX DISEASE: Status: ACTIVE | Noted: 2019-07-08

## 2019-07-08 PROBLEM — R14.0 BLOATING: Status: ACTIVE | Noted: 2019-07-08

## 2019-07-08 PROBLEM — R63.0 POOR APPETITE: Status: ACTIVE | Noted: 2019-07-08

## 2019-07-12 ENCOUNTER — HOSPITAL ENCOUNTER (OUTPATIENT)
Facility: HOSPITAL | Age: 69
Setting detail: HOSPITAL OUTPATIENT SURGERY
Discharge: HOME | End: 2019-07-12
Attending: SURGERY | Admitting: SURGERY

## 2019-07-12 ENCOUNTER — ANESTHESIA EVENT (OUTPATIENT)
Dept: PERIOP | Facility: HOSPITAL | Age: 69
End: 2019-07-12

## 2019-07-12 ENCOUNTER — ANESTHESIA (OUTPATIENT)
Dept: PERIOP | Facility: HOSPITAL | Age: 69
End: 2019-07-12

## 2019-07-12 VITALS
RESPIRATION RATE: 18 BRPM | DIASTOLIC BLOOD PRESSURE: 66 MMHG | TEMPERATURE: 97.7 F | HEART RATE: 75 BPM | BODY MASS INDEX: 29.44 KG/M2 | HEIGHT: 62 IN | OXYGEN SATURATION: 95 % | SYSTOLIC BLOOD PRESSURE: 133 MMHG | WEIGHT: 160 LBS

## 2019-07-12 DIAGNOSIS — K21.9 GASTROESOPHAGEAL REFLUX DISEASE, ESOPHAGITIS PRESENCE NOT SPECIFIED: ICD-10-CM

## 2019-07-12 DIAGNOSIS — K92.1 BLACK STOOL: ICD-10-CM

## 2019-07-12 DIAGNOSIS — R14.0 BLOATING: ICD-10-CM

## 2019-07-12 DIAGNOSIS — R63.0 POOR APPETITE: ICD-10-CM

## 2019-07-12 LAB — GLUCOSE BLDC GLUCOMTR-MCNC: 145 MG/DL (ref 70–130)

## 2019-07-12 PROCEDURE — 25010000002 PROPOFOL 10 MG/ML EMULSION: Performed by: NURSE ANESTHETIST, CERTIFIED REGISTERED

## 2019-07-12 PROCEDURE — 87081 CULTURE SCREEN ONLY: CPT | Performed by: SURGERY

## 2019-07-12 PROCEDURE — 43239 EGD BIOPSY SINGLE/MULTIPLE: CPT | Performed by: SURGERY

## 2019-07-12 PROCEDURE — 82962 GLUCOSE BLOOD TEST: CPT

## 2019-07-12 RX ORDER — FENTANYL CITRATE 50 UG/ML
50 INJECTION, SOLUTION INTRAMUSCULAR; INTRAVENOUS
Status: DISCONTINUED | OUTPATIENT
Start: 2019-07-12 | End: 2019-07-12 | Stop reason: HOSPADM

## 2019-07-12 RX ORDER — ONDANSETRON 2 MG/ML
4 INJECTION INTRAMUSCULAR; INTRAVENOUS AS NEEDED
Status: DISCONTINUED | OUTPATIENT
Start: 2019-07-12 | End: 2019-07-12 | Stop reason: HOSPADM

## 2019-07-12 RX ORDER — SODIUM CHLORIDE, SODIUM LACTATE, POTASSIUM CHLORIDE, CALCIUM CHLORIDE 600; 310; 30; 20 MG/100ML; MG/100ML; MG/100ML; MG/100ML
125 INJECTION, SOLUTION INTRAVENOUS CONTINUOUS
Status: DISCONTINUED | OUTPATIENT
Start: 2019-07-12 | End: 2019-07-12 | Stop reason: HOSPADM

## 2019-07-12 RX ORDER — OXYCODONE HYDROCHLORIDE AND ACETAMINOPHEN 5; 325 MG/1; MG/1
1 TABLET ORAL ONCE AS NEEDED
Status: DISCONTINUED | OUTPATIENT
Start: 2019-07-12 | End: 2019-07-12 | Stop reason: HOSPADM

## 2019-07-12 RX ORDER — IPRATROPIUM BROMIDE AND ALBUTEROL SULFATE 2.5; .5 MG/3ML; MG/3ML
3 SOLUTION RESPIRATORY (INHALATION) ONCE AS NEEDED
Status: DISCONTINUED | OUTPATIENT
Start: 2019-07-12 | End: 2019-07-12 | Stop reason: HOSPADM

## 2019-07-12 RX ORDER — MEPERIDINE HYDROCHLORIDE 25 MG/ML
12.5 INJECTION INTRAMUSCULAR; INTRAVENOUS; SUBCUTANEOUS
Status: DISCONTINUED | OUTPATIENT
Start: 2019-07-12 | End: 2019-07-12 | Stop reason: HOSPADM

## 2019-07-12 RX ORDER — SODIUM CHLORIDE 0.9 % (FLUSH) 0.9 %
3 SYRINGE (ML) INJECTION EVERY 12 HOURS SCHEDULED
Status: DISCONTINUED | OUTPATIENT
Start: 2019-07-12 | End: 2019-07-12 | Stop reason: HOSPADM

## 2019-07-12 RX ORDER — PROPOFOL 10 MG/ML
VIAL (ML) INTRAVENOUS AS NEEDED
Status: DISCONTINUED | OUTPATIENT
Start: 2019-07-12 | End: 2019-07-12 | Stop reason: SURG

## 2019-07-12 RX ORDER — SODIUM CHLORIDE 0.9 % (FLUSH) 0.9 %
3-10 SYRINGE (ML) INJECTION AS NEEDED
Status: DISCONTINUED | OUTPATIENT
Start: 2019-07-12 | End: 2019-07-12 | Stop reason: HOSPADM

## 2019-07-12 RX ADMIN — SODIUM CHLORIDE, POTASSIUM CHLORIDE, SODIUM LACTATE AND CALCIUM CHLORIDE: 600; 310; 30; 20 INJECTION, SOLUTION INTRAVENOUS at 10:39

## 2019-07-12 RX ADMIN — PROPOFOL 100 MG: 10 INJECTION, EMULSION INTRAVENOUS at 10:43

## 2019-07-12 NOTE — ANESTHESIA PREPROCEDURE EVALUATION
Anesthesia Evaluation     Patient summary reviewed and Nursing notes reviewed   no history of anesthetic complications:  NPO Solid Status: > 8 hours  NPO Liquid Status: > 8 hours           Airway   Mallampati: II  TM distance: >3 FB  Neck ROM: full  no difficulty expected  Dental - normal exam   (+) edentulous    Pulmonary - normal exam   (+) a smoker Current Smoked day of surgery, COPD, shortness of breath,   Cardiovascular - normal exam  Exercise tolerance: good (4-7 METS)    NYHA Classification: II    (+) hypertension, dysrhythmias, hyperlipidemia,   (-) past MI, angina, CHF      Neuro/Psych  (+) psychiatric history Depression,     (-) seizures, CVA  GI/Hepatic/Renal/Endo    (+)  GERD, GI bleeding, diabetes mellitus,   (-) hypothyroidism    Musculoskeletal     Abdominal  - normal exam    Bowel sounds: normal.   Substance History - negative use     OB/GYN negative ob/gyn ROS         Other   (+) arthritis                       Anesthesia Plan    ASA 3     general     intravenous induction   Anesthetic plan, all risks, benefits, and alternatives have been provided, discussed and informed consent has been obtained with: patient.  Use of blood products discussed with patient  Consented to blood products.

## 2019-07-12 NOTE — ANESTHESIA POSTPROCEDURE EVALUATION
Patient: Valerie Stroud    Procedure Summary     Date:  07/12/19 Room / Location:  [facility] OR  /  [facility] OR    Anesthesia Start:  1039 Anesthesia Stop:  1048    Procedure:  ESOPHAGOGASTRODUODENOSCOPY WITH BIOPSY CPT CODE: 82059 (N/A Esophagus) Diagnosis:       Gastroesophageal reflux disease, esophagitis presence not specified      Black stool      Bloating      Poor appetite      (Gastroesophageal reflux disease, esophagitis presence not specified [K21.9])      (Black stool [K92.1])      (Bloating [R14.0])      (Poor appetite [R63.0])    Surgeon:  Néstor Logan MD Provider:  Bunny Santamaria MD    Anesthesia Type:  general ASA Status:  3          Anesthesia Type: general  Last vitals  BP   136/74 (07/12/19 0719)   Temp   98.4 °F (36.9 °C) (07/12/19 0719)   Pulse   75 (07/12/19 0719)   Resp   18 (07/12/19 0719)     SpO2   96 % (07/12/19 0719)     Post Anesthesia Care and Evaluation    Patient location during evaluation: PHASE II  Patient participation: complete - patient participated  Level of consciousness: awake and alert  Pain score: 1  Pain management: adequate  Airway patency: patent  Anesthetic complications: No anesthetic complications  PONV Status: controlled  Cardiovascular status: acceptable  Respiratory status: acceptable  Hydration status: acceptable

## 2019-07-13 LAB — UREASE TISS QL: POSITIVE

## 2019-07-15 ENCOUNTER — TELEPHONE (OUTPATIENT)
Dept: UROLOGY | Facility: CLINIC | Age: 69
End: 2019-07-15

## 2019-07-15 DIAGNOSIS — K21.9 GASTROESOPHAGEAL REFLUX DISEASE, ESOPHAGITIS PRESENCE NOT SPECIFIED: ICD-10-CM

## 2019-07-15 DIAGNOSIS — K59.04 CHRONIC IDIOPATHIC CONSTIPATION: ICD-10-CM

## 2019-07-15 RX ORDER — DEXLANSOPRAZOLE 60 MG/1
60 CAPSULE, DELAYED RELEASE ORAL DAILY
Qty: 30 CAPSULE | Refills: 5 | Status: SHIPPED | OUTPATIENT
Start: 2019-07-15

## 2019-07-15 NOTE — TELEPHONE ENCOUNTER
Needs a refill called in for linzess 145 mg and bexilant 60 mg called in to [facility].  She does not have insurance and they will help her with the cost.

## 2019-07-15 NOTE — TELEPHONE ENCOUNTER
I spoke with patient. I let her know that her meds were sent to her pharmacy and that I faxed co-pay cards to the pharmacy. She voiced understanding.

## 2019-07-29 DIAGNOSIS — A04.8 H. PYLORI INFECTION: Primary | ICD-10-CM

## 2019-07-29 RX ORDER — METRONIDAZOLE 500 MG/1
500 TABLET ORAL 2 TIMES DAILY
Qty: 20 TABLET | Refills: 0 | Status: SHIPPED | OUTPATIENT
Start: 2019-07-29 | End: 2019-08-08

## 2019-07-29 RX ORDER — DOXYCYCLINE HYCLATE 100 MG/1
100 TABLET, DELAYED RELEASE ORAL 2 TIMES DAILY
Qty: 20 TABLET | Refills: 0 | Status: SHIPPED | OUTPATIENT
Start: 2019-07-29 | End: 2019-08-08

## 2019-07-29 RX ORDER — BISMUTH SUBSALICYLATE 262 MG/1
524 TABLET, CHEWABLE ORAL
Qty: 80 TABLET | Refills: 0 | Status: SHIPPED | OUTPATIENT
Start: 2019-07-29 | End: 2019-08-08

## 2019-07-30 ENCOUNTER — TELEPHONE (OUTPATIENT)
Dept: SURGERY | Facility: CLINIC | Age: 69
End: 2019-07-30

## 2019-07-30 NOTE — TELEPHONE ENCOUNTER
Left message to return a call to the office. Patient had an EGD and she has H. Pylori and medications were sent to her pharmacy.

## 2019-08-05 DIAGNOSIS — R11.0 NAUSEA: Primary | ICD-10-CM

## 2019-08-05 RX ORDER — PROMETHAZINE HYDROCHLORIDE 25 MG/1
25 TABLET ORAL EVERY 8 HOURS PRN
Qty: 30 TABLET | Refills: 0 | Status: SHIPPED | OUTPATIENT
Start: 2019-08-05 | End: 2020-08-05 | Stop reason: ALTCHOICE

## 2019-08-07 ENCOUNTER — TELEPHONE (OUTPATIENT)
Dept: SURGERY | Facility: CLINIC | Age: 69
End: 2019-08-07

## 2019-08-07 NOTE — TELEPHONE ENCOUNTER
Patient called back to report that even with the Phenergan, the H. Pylori medications were making her very sick. She complains of abdominal pain, severe nausea, and weakness. Patient was told that Dr. Logan was out of the office, and that I would send him a message about the issue to see what he wants her to do. Patient states that she ate some Activia yogurt and it seemed to make her feel better. I told her to keep doing so, because the probiotics would help. She is choosing to stop the Flagyl at this time and finish the 100 mg Doxycycline for the remaining 10 day period. She states she will finish the Flagyl 500 mg after she completes the Doxycycline if needed, but that she can not continue taking both simultaneously.   I will inform Dr. Logan of this and I will call and advise the patient per Dr. Logan's orders.    TS 8/7/19 @2:00 pm

## 2019-08-07 NOTE — TELEPHONE ENCOUNTER
Called the patient to let her know Dr. Logan said that she could take the antibiotics separately bc they were causing her so much abdominal discomfort. She will finish the Doxycycline 100 mg and upon finishing that she will take the Flagyl 500 mg and finish that round out. She is also taking a probiotic daily.

## 2019-08-13 ENCOUNTER — TELEPHONE (OUTPATIENT)
Dept: GASTROENTEROLOGY | Facility: CLINIC | Age: 69
End: 2019-08-13

## 2019-08-13 NOTE — TELEPHONE ENCOUNTER
Monique patient called and wanted to know if she could have some samples of Linzess 145. She is still waiting on a approval?    Please and Thank you

## 2019-08-13 NOTE — TELEPHONE ENCOUNTER
I spoke with patient to let her know samples were set aside for her and she asked if we could please mail them. I mailed them out today.

## 2020-08-05 ENCOUNTER — OFFICE VISIT (OUTPATIENT)
Dept: CARDIOLOGY | Facility: CLINIC | Age: 70
End: 2020-08-05

## 2020-08-05 VITALS
SYSTOLIC BLOOD PRESSURE: 135 MMHG | HEART RATE: 81 BPM | BODY MASS INDEX: 30.73 KG/M2 | WEIGHT: 167 LBS | DIASTOLIC BLOOD PRESSURE: 75 MMHG | RESPIRATION RATE: 16 BRPM | HEIGHT: 62 IN

## 2020-08-05 DIAGNOSIS — E11.9 TYPE 2 DIABETES MELLITUS WITHOUT COMPLICATION, WITHOUT LONG-TERM CURRENT USE OF INSULIN (HCC): ICD-10-CM

## 2020-08-05 DIAGNOSIS — E78.5 DYSLIPIDEMIA: ICD-10-CM

## 2020-08-05 DIAGNOSIS — I10 ESSENTIAL HYPERTENSION: Primary | ICD-10-CM

## 2020-08-05 PROCEDURE — 99213 OFFICE O/P EST LOW 20 MIN: CPT | Performed by: PHYSICIAN ASSISTANT

## 2020-08-05 PROCEDURE — 93000 ELECTROCARDIOGRAM COMPLETE: CPT | Performed by: PHYSICIAN ASSISTANT

## 2020-08-05 RX ORDER — OMEPRAZOLE 40 MG/1
40 CAPSULE, DELAYED RELEASE ORAL DAILY
COMMUNITY
End: 2021-08-04

## 2020-08-05 NOTE — PROGRESS NOTES
Kendrick Lemus MD  Valerie Stroud  1950 08/05/2020    Patient Active Problem List   Diagnosis   • Essential hypertension   • Dyslipidemia   • Dyspnea, unspecified   • Early satiety   • Dyspepsia   • Epigastric abdominal pain   • RUQ abdominal pain   • Weight gain, abnormal   • History of colon polyps   • Constipation   • Type 2 diabetes mellitus without complication (CMS/HCC)   • Bilateral leg edema with no clinical signs of heart failure.   • Palpitation   • Gastroesophageal reflux disease   • Black stool   • Bloating   • Poor appetite       Dear Kendrick Lemus MD:    Subjective     History of Present Illness:    Chief Complaint   Patient presents with   • Med Management     med list.    • Hypertension     1+ year follow   • Edema     longstanding       Valerie Stroud is a pleasant 70 y.o. female with a past medical history significant for essential hypertension, dyslipidemia, history of pedal edema.  She comes in today for her annual routine cardiology follow-up.    Patient reports that she is doing well from cardiac standpoint. She does report that she walks daily with a goal of 3 miles and typically does hit that goal. Patient denies any chest pain, shortness of breath, palpitations, dizziness, syncope or near syncope.       No Known Allergies:      Current Outpatient Medications:   •  amLODIPine (NORVASC) 5 MG tablet, Take 5 mg by mouth Daily., Disp: , Rfl:   •  aspirin 81 MG EC tablet, Take 81 mg by mouth daily., Disp: , Rfl:   •  atorvastatin (LIPITOR) 40 MG tablet, Take 40 mg by mouth Daily., Disp: , Rfl:   •  carvedilol (COREG) 12.5 MG tablet, TAKE ONE TABLET BY MOUTH TWO TIMES A DAY FOR THE HEART OR BLOOD PRESSURE, Disp: 180 tablet, Rfl: 0  •  dexlansoprazole (DEXILANT) 60 MG capsule, Take 1 capsule by mouth Daily., Disp: 30 capsule, Rfl: 5  •  exenatide er (BYDUREON) 2 MG pen-injector injection, Inject 2 mg under the skin into the appropriate area as directed 1 (One) Time Per  "Week., Disp: , Rfl:   •  glimepiride (AMARYL) 4 MG tablet, Take 4 mg by mouth Every Morning Before Breakfast., Disp: , Rfl:   •  losartan (COZAAR) 50 MG tablet, Take 75 mg by mouth Every Night. Takes 1 & 1/2 of 50mg, Disp: , Rfl:   •  metFORMIN ER (GLUCOPHAGE-XR) 500 MG 24 hr tablet, Take 1,000 mg by mouth 2 (Two) Times a Day., Disp: , Rfl:   •  omeprazole (priLOSEC) 40 MG capsule, Take 40 mg by mouth Daily., Disp: , Rfl:   •  QUEtiapine (SEROquel) 200 MG tablet, Take 200 mg by mouth every night., Disp: , Rfl:   •  traMADol (ULTRAM) 50 MG tablet, Take 50 mg by mouth 2 (two) times a day., Disp: , Rfl:     The following portions of the patient's history were reviewed and updated as appropriate: allergies, current medications, past family history, past medical history, past social history, past surgical history and problem list.    Social History     Tobacco Use   • Smoking status: Current Every Day Smoker     Packs/day: 0.50     Years: 25.00     Pack years: 12.50     Types: Cigarettes   • Smokeless tobacco: Never Used   Substance Use Topics   • Alcohol use: No   • Drug use: No       Review of Systems   Constitution: Negative for malaise/fatigue.   Cardiovascular: Negative for chest pain, dyspnea on exertion, irregular heartbeat and palpitations.   Respiratory: Negative for cough and shortness of breath.    Hematologic/Lymphatic: Negative for bleeding problem. Does not bruise/bleed easily.   Gastrointestinal: Negative for nausea and vomiting.   Neurological: Negative for weakness.       Objective   Vitals:    08/05/20 1419   BP: 135/75   Pulse: 81   Resp: 16   Weight: 75.8 kg (167 lb)   Height: 157.5 cm (62\")     Body mass index is 30.54 kg/m².    Physical Exam   Constitutional: She is oriented to person, place, and time. She appears well-developed and well-nourished. No distress.   HENT:   Head: Normocephalic and atraumatic.   Cardiovascular: Normal rate, regular rhythm and normal heart sounds.   Pulmonary/Chest: " Effort normal and breath sounds normal. No respiratory distress.   Musculoskeletal: She exhibits no edema.   Neurological: She is alert and oriented to person, place, and time.   Skin: She is not diaphoretic.       Lab Results   Component Value Date     06/27/2019    K 4.2 06/27/2019     06/27/2019    CO2 22.9 06/27/2019    BUN 23 06/27/2019    CREATININE 0.61 06/27/2019    GLUCOSE 156 (H) 06/27/2019    CALCIUM 9.6 06/27/2019    AST 19 06/27/2019    ALT 20 06/27/2019    ALKPHOS 79 06/27/2019    LABIL2 1.6 12/19/2015     Lab Results   Component Value Date    CKTOTAL 83 04/04/2018     Lab Results   Component Value Date    WBC 7.58 06/27/2019    HGB 12.5 06/27/2019    HCT 37.8 06/27/2019     06/27/2019     No results found for: INR  Lab Results   Component Value Date    MG 2.1 06/27/2019     Lab Results   Component Value Date    TSH 3.293 10/24/2017      No results found for: BNP    During this visit the following were done:  Labs Reviewed [x]    Labs Ordered []    Radiology Reports Reviewed [x]    Radiology Ordered []    PCP Records Reviewed []    Referring Provider Records Reviewed []    ER Records Reviewed []    Hospital Records Reviewed []    History Obtained From Family []    Radiology Images Reviewed []    Other Reviewed []    Records Requested []         ECG 12 Lead  Date/Time: 8/5/2020 2:22 PM  Performed by: Jayro Marquez PA-C  Authorized by: Jayro Marquez PA-C   Comparison: compared with previous ECG   Similar to previous ECG  Rhythm: sinus rhythm  Conduction: conduction normal  ST Segments: ST segments normal    Clinical impression: normal ECG            Assessment/Plan    Diagnosis Plan   1. Essential hypertension     2. Type 2 diabetes mellitus without complication, without long-term current use of insulin (CMS/[facility])     3. Dyslipidemia              Recommendations:  1. patient is doing well from cardiac standpoint. Encouraged her to continue walking daily. She is asmyptomatic with  good blood pressure control.   2. Continue coreg, losartan, lipitor, aspirin, and amlodipine.     Return in about 1 year (around 8/5/2021).    As always, I appreciate very much the opportunity to participate in the cardiovascular care of your patients.      With Best Regards,    Jayro Marquez PA-C

## 2021-08-04 ENCOUNTER — OFFICE VISIT (OUTPATIENT)
Dept: CARDIOLOGY | Facility: CLINIC | Age: 71
End: 2021-08-04

## 2021-08-04 VITALS
DIASTOLIC BLOOD PRESSURE: 83 MMHG | HEART RATE: 77 BPM | WEIGHT: 168 LBS | BODY MASS INDEX: 30.91 KG/M2 | HEIGHT: 62 IN | SYSTOLIC BLOOD PRESSURE: 138 MMHG | RESPIRATION RATE: 16 BRPM

## 2021-08-04 DIAGNOSIS — E78.5 DYSLIPIDEMIA: ICD-10-CM

## 2021-08-04 DIAGNOSIS — R00.2 PALPITATION: ICD-10-CM

## 2021-08-04 DIAGNOSIS — I10 ESSENTIAL HYPERTENSION: Primary | ICD-10-CM

## 2021-08-04 PROCEDURE — 99213 OFFICE O/P EST LOW 20 MIN: CPT | Performed by: PHYSICIAN ASSISTANT

## 2021-08-04 PROCEDURE — 93000 ELECTROCARDIOGRAM COMPLETE: CPT | Performed by: PHYSICIAN ASSISTANT

## 2021-08-04 RX ORDER — HYDROXYZINE HYDROCHLORIDE 25 MG/1
25 TABLET, FILM COATED ORAL 3 TIMES DAILY PRN
COMMUNITY

## 2021-08-04 NOTE — PROGRESS NOTES
Kendrick Lemus MD  Valerie Stroud  1950 08/04/2021    Patient Active Problem List   Diagnosis   • Essential hypertension   • Dyslipidemia   • Dyspnea, unspecified   • Early satiety   • Dyspepsia   • Epigastric abdominal pain   • RUQ abdominal pain   • Weight gain, abnormal   • History of colon polyps   • Constipation   • Type 2 diabetes mellitus without complication (CMS/HCC)   • Bilateral leg edema with no clinical signs of heart failure.   • Palpitation   • Gastroesophageal reflux disease   • Black stool   • Bloating   • Poor appetite       Dear Kendrick Lemus MD:    Subjective     History of Present Illness:    Chief Complaint   Patient presents with   • Palpitations     1 year follow   • Med Management     list provided       Valerie Stroud is a pleasant 71 y.o. female with a past medical history significant for essential hypertension and dyslipidemia. She comes in today for for routine cardiology follow up.     Mrs. Stroud reports that she has been doing well. She admits that she does not check her blood pressure at home but has been controlled at her pcp's office. Patient denies any chest pain, shortness of breath, palpitations, dizziness, syncope or near syncope.     No Known Allergies:      Current Outpatient Medications:   •  amLODIPine (NORVASC) 5 MG tablet, Take 5 mg by mouth Daily., Disp: , Rfl:   •  aspirin 81 MG EC tablet, Take 81 mg by mouth Daily. 2 tabs of the morning., Disp: , Rfl:   •  atorvastatin (LIPITOR) 40 MG tablet, Take 40 mg by mouth Daily., Disp: , Rfl:   •  carvedilol (COREG) 12.5 MG tablet, TAKE ONE TABLET BY MOUTH TWO TIMES A DAY FOR THE HEART OR BLOOD PRESSURE, Disp: 180 tablet, Rfl: 0  •  dexlansoprazole (DEXILANT) 60 MG capsule, Take 1 capsule by mouth Daily., Disp: 30 capsule, Rfl: 5  •  exenatide er (BYDUREON) 2 MG pen-injector injection, Inject 2 mg under the skin into the appropriate area as directed 1 (One) Time Per Week., Disp: , Rfl:   •  glimepiride  "(AMARYL) 4 MG tablet, Take 4 mg by mouth Every Morning Before Breakfast., Disp: , Rfl:   •  hydrOXYzine (ATARAX) 25 MG tablet, Take 25 mg by mouth 3 (Three) Times a Day As Needed for Itching., Disp: , Rfl:   •  linaclotide (LINZESS) 290 MCG capsule capsule, Take 290 mcg by mouth Every Morning Before Breakfast., Disp: , Rfl:   •  losartan (COZAAR) 50 MG tablet, Take 75 mg by mouth Every Night. Takes 1 & 1/2 of 50mg, Disp: , Rfl:   •  metFORMIN ER (GLUCOPHAGE-XR) 500 MG 24 hr tablet, Take 1,000 mg by mouth 2 (Two) Times a Day., Disp: , Rfl:   •  QUEtiapine (SEROquel) 200 MG tablet, Take 200 mg by mouth every night., Disp: , Rfl:   •  traMADol (ULTRAM) 50 MG tablet, Take 50 mg by mouth 2 (two) times a day., Disp: , Rfl:     The following portions of the patient's history were reviewed and updated as appropriate: allergies, current medications, past family history, past medical history, past social history, past surgical history and problem list.    Social History     Tobacco Use   • Smoking status: Current Every Day Smoker     Packs/day: 0.50     Years: 25.00     Pack years: 12.50     Types: Cigarettes   • Smokeless tobacco: Never Used   Substance Use Topics   • Alcohol use: No   • Drug use: No         Objective   Vitals:    08/04/21 0928   BP: 138/83   Pulse: 77   Resp: 16   Weight: 76.2 kg (168 lb)   Height: 157.5 cm (62\")     Body mass index is 30.73 kg/m².    Constitutional:       General: Not in acute distress.     Appearance: Healthy appearance. Well-developed and not in distress. Not diaphoretic.   Eyes:      Conjunctiva/sclera: Conjunctivae normal.      Pupils: Pupils are equal, round, and reactive to light.   HENT:      Head: Normocephalic and atraumatic.   Neck:      Vascular: No carotid bruit or JVD.   Pulmonary:      Effort: Pulmonary effort is normal. No respiratory distress.      Breath sounds: Normal breath sounds.   Cardiovascular:      Normal rate. Regular rhythm.   Skin:     General: Skin is cool. "   Neurological:      Mental Status: Alert, oriented to person, place, and time and oriented to person, place and time.         Lab Results   Component Value Date     06/27/2019    K 4.2 06/27/2019     06/27/2019    CO2 22.9 06/27/2019    BUN 23 06/27/2019    CREATININE 0.61 06/27/2019    GLUCOSE 156 (H) 06/27/2019    CALCIUM 9.6 06/27/2019    AST 19 06/27/2019    ALT 20 06/27/2019    ALKPHOS 79 06/27/2019    LABIL2 1.6 12/19/2015     Lab Results   Component Value Date    CKTOTAL 83 04/04/2018     Lab Results   Component Value Date    WBC 7.58 06/27/2019    HGB 12.5 06/27/2019    HCT 37.8 06/27/2019     06/27/2019     No results found for: INR  Lab Results   Component Value Date    MG 2.1 06/27/2019     Lab Results   Component Value Date    TSH 3.293 10/24/2017      No results found for: BNP    During this visit the following were done:  Labs Reviewed []    Labs Ordered []    Radiology Reports Reviewed []    Radiology Ordered []    PCP Records Reviewed []    Referring Provider Records Reviewed []    ER Records Reviewed []    Hospital Records Reviewed []    History Obtained From Family []    Radiology Images Reviewed []    Other Reviewed []    Records Requested []         ECG 12 Lead    Date/Time: 8/4/2021 9:28 AM  Performed by: Jayro Marquez PA-C  Authorized by: Jayro Marquez PA-C   Comparison: compared with previous ECG   Similar to previous ECG  Rhythm: sinus rhythm  ST Segments: ST segments normal    Clinical impression: normal ECG            Assessment/Plan    Diagnosis Plan   1. Essential hypertension     2. Palpitation     3. Dyslipidemia              Recommendations:  1. Essential hypertension  1. Currently well controlled. Well managed by her pcp. Continue amlodpine, coreg, losartan.       No follow-ups on file.    As always, I appreciate very much the opportunity to participate in the cardiovascular care of your patients.      With Best Regards,    Jayro Marquez PA-C

## 2021-10-18 ENCOUNTER — TRANSCRIBE ORDERS (OUTPATIENT)
Dept: ADMINISTRATIVE | Facility: HOSPITAL | Age: 71
End: 2021-10-18

## 2021-10-18 DIAGNOSIS — R10.30 LOWER ABDOMINAL PAIN: Primary | ICD-10-CM

## 2021-10-20 ENCOUNTER — HOSPITAL ENCOUNTER (OUTPATIENT)
Dept: ULTRASOUND IMAGING | Facility: HOSPITAL | Age: 71
Discharge: HOME | End: 2021-10-20
Admitting: FAMILY MEDICINE

## 2021-10-20 DIAGNOSIS — R10.30 LOWER ABDOMINAL PAIN: ICD-10-CM

## 2021-10-20 PROCEDURE — 76830 TRANSVAGINAL US NON-OB: CPT | Performed by: RADIOLOGY

## 2021-10-20 PROCEDURE — 76830 TRANSVAGINAL US NON-OB: CPT

## 2021-11-01 ENCOUNTER — OFFICE VISIT (OUTPATIENT)
Dept: UROLOGY | Facility: CLINIC | Age: 71
End: 2021-11-01

## 2021-11-01 VITALS
HEIGHT: 62 IN | DIASTOLIC BLOOD PRESSURE: 78 MMHG | BODY MASS INDEX: 30.91 KG/M2 | WEIGHT: 168 LBS | SYSTOLIC BLOOD PRESSURE: 136 MMHG

## 2021-11-01 DIAGNOSIS — R35.0 FREQUENCY OF URINATION: Primary | ICD-10-CM

## 2021-11-01 DIAGNOSIS — R31.0 GROSS HEMATURIA: ICD-10-CM

## 2021-11-01 LAB
BILIRUB BLD-MCNC: NEGATIVE MG/DL
CLARITY, POC: CLEAR
COLOR UR: YELLOW
EXPIRATION DATE: NORMAL
GLUCOSE UR STRIP-MCNC: NEGATIVE MG/DL
KETONES UR QL: NEGATIVE
LEUKOCYTE EST, POC: NEGATIVE
Lab: NORMAL
NITRITE UR-MCNC: NEGATIVE MG/ML
PH UR: 6 [PH] (ref 5–8)
PROT UR STRIP-MCNC: NEGATIVE MG/DL
RBC # UR STRIP: NEGATIVE /UL
SP GR UR: 1.02 (ref 1–1.03)
UROBILINOGEN UR QL: NORMAL

## 2021-11-01 PROCEDURE — 80048 BASIC METABOLIC PNL TOTAL CA: CPT | Performed by: UROLOGY

## 2021-11-01 PROCEDURE — 99203 OFFICE O/P NEW LOW 30 MIN: CPT | Performed by: UROLOGY

## 2021-11-01 PROCEDURE — 81003 URINALYSIS AUTO W/O SCOPE: CPT | Performed by: UROLOGY

## 2021-11-01 RX ORDER — HYDROCODONE BITARTRATE AND ACETAMINOPHEN 5; 325 MG/1; MG/1
TABLET ORAL
COMMUNITY
Start: 2021-10-22 | End: 2022-08-10 | Stop reason: ALTCHOICE

## 2021-11-01 NOTE — PROGRESS NOTES
"       Office Visit New Urology      Patient Name: Valerie Stroud  : 1950   MRN: [id number]     Chief Complaint:    Chief Complaint   Patient presents with   • urinary mass       Referring Provider: No ref. provider found    History of Present Illness: Valerie Stroud is a 71 y.o. female who presents to Urology today for a mass in the bladder for an ultrasound.  Since September she has had vague lower abdominal pain.  She reports at least 2 \"bladder tacks\".  She also has undergone a hysterectomy.    Complains of a lot of burning and pressure as well.  She also complains of vaginal pain and heaviness and for the past 20 years has been using sitz bath.    Has delivered 7 children vaginally.  No complications with deliveries.    She smokes 1/2 ppd x 20 years.    No gross hematuria.      Subjective      Review of System: Review of Systems   Constitutional: Positive for fatigue.   HENT: Negative.    Eyes: Negative.    Respiratory: Negative.    Cardiovascular: Negative.    Gastrointestinal: Negative.    Endocrine: Negative.    Genitourinary: Positive for difficulty urinating, dysuria, flank pain, frequency, pelvic pain and vaginal pain. Negative for decreased urine volume, dyspareunia, enuresis, genital sores, hematuria, menstrual problem, urgency, vaginal bleeding and vaginal discharge.   Musculoskeletal: Positive for back pain.   Skin: Negative.    Allergic/Immunologic: Negative.    Neurological: Negative.    Hematological: Negative.    Psychiatric/Behavioral: Negative.       I have reviewed the ROS documented by my clinical staff, I have updated appropriately and I agree. González Storm MD    Past Medical History:   Past Medical History:   Diagnosis Date   • Arthritis    • DMII (diabetes mellitus, type 2) ([facility])    • Dyslipidemia    • Elevated cholesterol    • GERD (gastroesophageal reflux disease)    • HTN (hypertension)    • Hyperlipidemia    • Type 1 diabetes mellitus (HCC) 2017   • Type 2 diabetes " mellitus with complication (HCC) 12/20/2017       Past Surgical History:   Past Surgical History:   Procedure Laterality Date   • BLADDER SUSPENSION     • CATARACT EXTRACTION W/ INTRAOCULAR LENS  IMPLANT, BILATERAL     • CHOLECYSTECTOMY  11/04/2015    laparoscopic   • COLONOSCOPY  03/30/2009   • COLONOSCOPY N/A 12/1/2017    Procedure: COLONOSCOPY FOR SCREENING CPT CODE: ;  Surgeon: Deshawn Ibanez III, MD;  Location: [facility];  Service:    • ENDOSCOPY  02/2002   • ENDOSCOPY N/A 12/1/2017    Procedure: ESOPHAGOGASTRODUODENOSCOPY WITH BIOPSY CPT CODE: 89211;  Surgeon: Deshawn Ibanez III, MD;  [facility];  Service:    • ENDOSCOPY N/A 7/12/2019    Procedure: ESOPHAGOGASTRODUODENOSCOPY WITH BIOPSY CPT CODE: 35572;  Surgeon: Néstor Logan MD;  Location: [facility];  Service: Gastroenterology   • KNEE SURGERY Bilateral    • SUBTOTAL HYSTERECTOMY     • VARICOSE VEIN SURGERY         Family History:   Family History   Problem Relation Age of Onset   • Coronary artery disease Mother    • Coronary artery disease Father 69        fatal MI age 69   • Heart attack Father    • Heart defect Sister         pacemaker   • Liver cancer Sister    • Heart attack Brother 29        s/p CAGB   • Coronary artery disease Brother         fatal MI, age 50   • Heart attack Brother 39       Social History:   Social History     Socioeconomic History   • Marital status:    Tobacco Use   • Smoking status: Current Every Day Smoker     Packs/day: 0.50     Years: 25.00     Pack years: 12.50     Types: Cigarettes   • Smokeless tobacco: Never Used   Substance and Sexual Activity   • Alcohol use: No   • Drug use: No   • Sexual activity: Defer       Medications:     Current Outpatient Medications:   •  amLODIPine (NORVASC) 5 MG tablet, Take 5 mg by mouth Daily., Disp: , Rfl:   •  aspirin 81 MG EC tablet, Take 81 mg by mouth Daily. 2 tabs of the morning., Disp: , Rfl:   •  atorvastatin (LIPITOR) 40 MG tablet, Take 40 mg by  "mouth Daily., Disp: , Rfl:   •  carvedilol (COREG) 12.5 MG tablet, TAKE ONE TABLET BY MOUTH TWO TIMES A DAY FOR THE HEART OR BLOOD PRESSURE, Disp: 180 tablet, Rfl: 0  •  dexlansoprazole (DEXILANT) 60 MG capsule, Take 1 capsule by mouth Daily., Disp: 30 capsule, Rfl: 5  •  exenatide er (BYDUREON) 2 MG pen-injector injection, Inject 2 mg under the skin into the appropriate area as directed 1 (One) Time Per Week., Disp: , Rfl:   •  glimepiride (AMARYL) 4 MG tablet, Take 4 mg by mouth Every Morning Before Breakfast., Disp: , Rfl:   •  HYDROcodone-acetaminophen (NORCO) 5-325 MG per tablet, TAKE 1 TABLET BY MOUTH EVERY 6 HOURS AS NEEDED for SEVERE PAIN, Disp: , Rfl:   •  hydrOXYzine (ATARAX) 25 MG tablet, Take 25 mg by mouth 3 (Three) Times a Day As Needed for Itching., Disp: , Rfl:   •  linaclotide (LINZESS) 290 MCG capsule capsule, Take 290 mcg by mouth Every Morning Before Breakfast., Disp: , Rfl:   •  losartan (COZAAR) 50 MG tablet, Take 75 mg by mouth Every Night. Takes 1 & 1/2 of 50mg, Disp: , Rfl:   •  metFORMIN ER (GLUCOPHAGE-XR) 500 MG 24 hr tablet, Take 1,000 mg by mouth 2 (Two) Times a Day., Disp: , Rfl:   •  QUEtiapine (SEROquel) 200 MG tablet, Take 200 mg by mouth every night., Disp: , Rfl:   •  traMADol (ULTRAM) 50 MG tablet, Take 50 mg by mouth 2 (two) times a day., Disp: , Rfl:     Allergies:   No Known Allergies        Objective     Physical Exam:   Vital Signs:   Vitals:    11/01/21 1502   BP: 136/78   Weight: 76.2 kg (168 lb)   Height: 157.5 cm (62.01\")     Body mass index is 30.72 kg/m².     Physical Exam  Vitals and nursing note reviewed. Exam conducted with a chaperone present.   Constitutional:       General: She is awake. She is not in acute distress.     Appearance: Normal appearance.   HENT:      Head: Normocephalic and atraumatic.      Right Ear: External ear normal.      Left Ear: External ear normal.      Nose: Nose normal.   Eyes:      Conjunctiva/sclera: Conjunctivae normal.   Pulmonary:     "  Effort: Pulmonary effort is normal. No respiratory distress.   Abdominal:      General: Abdomen is flat. There is no distension.      Palpations: Abdomen is soft. There is no mass.      Tenderness: There is no abdominal tenderness. There is no left CVA tenderness, guarding or rebound.      Hernia: No hernia is present.   Genitourinary:     Exam position: Lithotomy position.   Skin:     General: Skin is warm.   Neurological:      General: No focal deficit present.      Mental Status: She is alert and oriented to person, place, and time.      Gait: Gait normal.   Psychiatric:         Behavior: Behavior normal. Behavior is cooperative.         Thought Content: Thought content normal.         Judgment: Judgment normal.         Labs:   Brief Urine Lab Results  (Last result in the past 365 days)      Color   Clarity   Blood   Leuk Est   Nitrite   Protein   CREAT   Urine HCG        11/01/21 1509 Yellow   Clear   Negative   Negative   Negative   Negative                      Lab Results   Component Value Date    GLUCOSE 156 (H) 06/27/2019    CALCIUM 9.6 06/27/2019     06/27/2019    K 4.2 06/27/2019    CO2 22.9 06/27/2019     06/27/2019    BUN 23 06/27/2019    CREATININE 0.61 06/27/2019    EGFRIFNONA 97 06/27/2019    BCR 37.7 (H) 06/27/2019    ANIONGAP 10.1 06/27/2019       Lab Results   Component Value Date    WBC 7.58 06/27/2019    HGB 12.5 06/27/2019    HCT 37.8 06/27/2019    MCV 88.5 06/27/2019     06/27/2019       No results found for: PSA     Images:   US Non-ob Transvaginal    Result Date: 10/20/2021  1. No complex adnexal mass. Ovaries were not visualized. 2. Abnormal mass which appears to be adherent to the urinary bladder wall.  This report was finalized on 10/20/2021 1:48 PM by Dr. Minh Zacarias MD.        Measures:   Tobacco:   Valerie Stroud  reports that she has been smoking cigarettes. She has a 12.50 pack-year smoking history. She has never used smokeless tobacco.. I have educated her on  the risk of diseases from using tobacco products such as cancer, COPD and heart disease.     I advised her to quit and she is not willing to quit.    I spent 3  minutes counseling the patient.      Urine Incontinence: ( NOUI)  Patient reports that she is currently experiencing any symptoms of urinary incontinence.    Denies any BERNARDO but does have urge incontinence.       Assessment / Plan      Assessment/Plan:   Valerie Stroud is a 71 y.o. female who presented today for dental finding of a bladder mass on ultrasound.  I will obtain a CT urogram and have her follow-up within a week for cystoscopy.  We will perform a pelvic exam at her next visit as well.  I discussed the possibilities and potential diagnosis of her bladder mass as well as her pain.    Diagnoses and all orders for this visit:    1. Frequency of urination (Primary)  -     POC Urinalysis Dipstick, Automated    2. Gross hematuria  -     Basic Metabolic Panel; Future  -     CT Abdomen Pelvis With & Without Contrast; Future  -     Basic Metabolic Panel         Follow Up:   Return today (on 11/1/2021).    I spent approximately 30 minutes providing clinical care for this patient; including review of patient's chart and provider documentation, face to face time spent with patient in examination room (obtaining history, performing physical exam, discussing diagnosis and management options), placing orders, and completing patient documentation.     Gonzláez Storm MD  [facility]

## 2021-11-02 LAB
ANION GAP SERPL CALCULATED.3IONS-SCNC: 13.2 MMOL/L (ref 5–15)
BUN SERPL-MCNC: 16 MG/DL (ref 8–23)
BUN/CREAT SERPL: 18.4 (ref 7–25)
CALCIUM SPEC-SCNC: 9.6 MG/DL (ref 8.6–10.5)
CHLORIDE SERPL-SCNC: 102 MMOL/L (ref 98–107)
CO2 SERPL-SCNC: 24.8 MMOL/L (ref 22–29)
CREAT SERPL-MCNC: 0.87 MG/DL (ref 0.57–1)
GFR SERPL CREATININE-BSD FRML MDRD: 64 ML/MIN/1.73
GLUCOSE SERPL-MCNC: 147 MG/DL (ref 65–99)
POTASSIUM SERPL-SCNC: 4.2 MMOL/L (ref 3.5–5.2)
SODIUM SERPL-SCNC: 140 MMOL/L (ref 136–145)

## 2021-11-05 ENCOUNTER — TELEPHONE (OUTPATIENT)
Dept: UROLOGY | Facility: CLINIC | Age: 71
End: 2021-11-05

## 2021-11-05 NOTE — TELEPHONE ENCOUNTER
Caller: Valerie Stroud    Relationship: Self    Best call back number: [phone]    What is the best time to reach you: ANYTIME    Who are you requesting to speak with (clinical staff, provider,  specific staff member): CLINICAL    PT WOULD LIKE A CALL BACK WITH ANSWERS AS TO WHETHER OR NOT SHE SHOULD DISCONTINUE TAKING HER CURRENT MEDS BEFORE HER 11/10 CYSTOSCOPY PROCEDURE

## 2021-11-08 ENCOUNTER — HOSPITAL ENCOUNTER (OUTPATIENT)
Dept: CT IMAGING | Facility: HOSPITAL | Age: 71
Discharge: HOME | End: 2021-11-08
Admitting: UROLOGY

## 2021-11-08 DIAGNOSIS — R31.0 GROSS HEMATURIA: ICD-10-CM

## 2021-11-08 PROCEDURE — 25010000002 IOPAMIDOL 61 % SOLUTION: Performed by: UROLOGY

## 2021-11-08 PROCEDURE — 74178 CT ABD&PLV WO CNTR FLWD CNTR: CPT | Performed by: RADIOLOGY

## 2021-11-08 PROCEDURE — 74178 CT ABD&PLV WO CNTR FLWD CNTR: CPT

## 2021-11-08 RX ADMIN — IOPAMIDOL 80 ML: 612 INJECTION, SOLUTION INTRAVENOUS at 08:40

## 2021-11-08 NOTE — TELEPHONE ENCOUNTER
Continue medications - no need to discontinue any meds for a cysto - pt was told by some random person to discontinue her metformin until weds -I explained we can not tell you to do such thing -call PCP

## 2021-11-10 ENCOUNTER — OFFICE VISIT (OUTPATIENT)
Dept: UROLOGY | Facility: CLINIC | Age: 71
End: 2021-11-10

## 2021-11-10 ENCOUNTER — TELEPHONE (OUTPATIENT)
Dept: UROLOGY | Facility: CLINIC | Age: 71
End: 2021-11-10

## 2021-11-10 VITALS — HEIGHT: 62 IN | BODY MASS INDEX: 30.91 KG/M2 | WEIGHT: 168 LBS

## 2021-11-10 DIAGNOSIS — R35.0 FREQUENCY OF URINATION: Primary | ICD-10-CM

## 2021-11-10 DIAGNOSIS — R33.9 INCOMPLETE EMPTYING OF BLADDER: ICD-10-CM

## 2021-11-10 DIAGNOSIS — N95.2 ATROPHIC VAGINITIS: ICD-10-CM

## 2021-11-10 DIAGNOSIS — K59.00 CONSTIPATION, UNSPECIFIED CONSTIPATION TYPE: ICD-10-CM

## 2021-11-10 LAB
BILIRUB BLD-MCNC: NEGATIVE MG/DL
CLARITY, POC: CLEAR
COLOR UR: YELLOW
EXPIRATION DATE: NORMAL
GLUCOSE UR STRIP-MCNC: NEGATIVE MG/DL
KETONES UR QL: NEGATIVE
LEUKOCYTE EST, POC: NEGATIVE
Lab: NORMAL
NITRITE UR-MCNC: NEGATIVE MG/ML
PH UR: 5 [PH] (ref 5–8)
PROT UR STRIP-MCNC: NEGATIVE MG/DL
RBC # UR STRIP: NEGATIVE /UL
SP GR UR: 1.02 (ref 1–1.03)
UROBILINOGEN UR QL: NORMAL

## 2021-11-10 PROCEDURE — 81003 URINALYSIS AUTO W/O SCOPE: CPT | Performed by: UROLOGY

## 2021-11-10 PROCEDURE — 52000 CYSTOURETHROSCOPY: CPT | Performed by: UROLOGY

## 2021-11-10 PROCEDURE — 99214 OFFICE O/P EST MOD 30 MIN: CPT | Performed by: UROLOGY

## 2021-11-10 RX ORDER — ESTRADIOL 0.1 MG/G
2 CREAM VAGINAL DAILY
Qty: 42.5 G | Refills: 3 | Status: SHIPPED | OUTPATIENT
Start: 2021-11-10 | End: 2021-11-10 | Stop reason: SDUPTHER

## 2021-11-10 RX ORDER — ESTRADIOL 0.1 MG/G
2 CREAM VAGINAL DAILY
Qty: 42.5 G | Refills: 3 | Status: SHIPPED | OUTPATIENT
Start: 2021-11-10 | End: 2021-12-10

## 2021-11-10 NOTE — TELEPHONE ENCOUNTER
Patient said that the estradiol was too expensive and Dr Storm said that it could be switched to another pharmacy. Per Dr Storm send it to [facility] pharmacy.

## 2021-11-10 NOTE — PROGRESS NOTES
"  Chief Complaint:       Possible bladder mass on ultrasound        HPI:       Mrs. Stroud is a 70 yo lady who has a history of a hysterectomy and \"bladder tack\".  She reports vague lower abdominal pain.  She also reports burning and irritation around the vagina.   She had a long history of constipation.  She reports she will sometimes go several days without a bowel movement and then her pain is worse.  No gross hematuria.     Her pelvic exam today showed atrophic vaginitis without any POP.      I also reviewed her CT urogram which did not reveal any acute  findings.     PVR = 45 mL    Procedure:      Procedure: Cystoscopy Female    Indication: bladder mass on ultrasound    Urinalysis Performed Today:  Negative for Infection    Informed Consent Obtained    Sterile prep performed in usual fashion    6 cc of topical lidocaine inserted urethrally    Flexible cystoscope inserted and bladder examined    Findings: normal: Urethra without lesions, Bladder mucosa without tumors or lesions, No stones seen, ureteral orifices are in orthotopic position and size.    Additional Procedure with Cystoscopy: none      Discussion:      Mrs. Stroud is a 70 yo lady with atrophic vaginitis.  I believe most of her issues are from her constipation.  She reports she has been dealing with this all her life.  Her cystoscopy today was normal without any mass.    I will start her on Estrace cream.  I discussed the possible SE.  I will also refer her to GI for management of her constipation.  She is currently on Linzess but states it does not help.     Assessment:     Encounter Diagnoses   Name Primary?   • Frequency of urination Yes   • Incomplete emptying of bladder    • Atrophic vaginitis    • Constipation, unspecified constipation type      Orders Placed This Encounter   Procedures   • Ambulatory Referral to Gastroenterology     Referral Priority:   Routine     Referral Type:   Consultation     Referral Reason:   Specialty Services " Required     Referred to Provider:   Kaitlynn Jerry PA-C     Requested Specialty:   Gastroenterology     Number of Visits Requested:   1   • Bladder Scan   • POC Urinalysis Dipstick, Automated     Order Specific Question:   Release to patient     Answer:   Immediate     NOW@

## 2022-05-19 ENCOUNTER — APPOINTMENT (OUTPATIENT)
Dept: MAMMOGRAPHY | Facility: HOSPITAL | Age: 72
End: 2022-05-19

## 2022-08-10 ENCOUNTER — OFFICE VISIT (OUTPATIENT)
Dept: CARDIOLOGY | Facility: CLINIC | Age: 72
End: 2022-08-10

## 2022-08-10 VITALS
HEART RATE: 69 BPM | RESPIRATION RATE: 16 BRPM | DIASTOLIC BLOOD PRESSURE: 72 MMHG | WEIGHT: 157 LBS | SYSTOLIC BLOOD PRESSURE: 119 MMHG | HEIGHT: 61 IN | BODY MASS INDEX: 29.64 KG/M2

## 2022-08-10 DIAGNOSIS — E78.5 DYSLIPIDEMIA: ICD-10-CM

## 2022-08-10 DIAGNOSIS — I10 ESSENTIAL HYPERTENSION: Primary | ICD-10-CM

## 2022-08-10 PROCEDURE — 99213 OFFICE O/P EST LOW 20 MIN: CPT | Performed by: PHYSICIAN ASSISTANT

## 2022-08-10 PROCEDURE — 93000 ELECTROCARDIOGRAM COMPLETE: CPT | Performed by: PHYSICIAN ASSISTANT

## 2022-08-10 NOTE — PROGRESS NOTES
Kendrick Lemus MD  Valerie Stroud  1950  08/10/2022    Patient Active Problem List   Diagnosis   • Essential hypertension   • Dyslipidemia   • Dyspnea, unspecified   • Early satiety   • Dyspepsia   • Epigastric abdominal pain   • RUQ abdominal pain   • Weight gain, abnormal   • History of colon polyps   • Constipation   • Type 2 diabetes mellitus without complication (HCC)   • Bilateral leg edema with no clinical signs of heart failure.   • Palpitation   • Gastroesophageal reflux disease   • Black stool   • Bloating   • Poor appetite       Dear Kendrick Lemus MD:    Subjective     History of Present Illness:    Chief Complaint   Patient presents with   • Palpitations     1 year follow   • Med Management     List provided       Valerie Stroud is a pleasant 72 y.o. female with a past medical history significant for    No Known Allergies:      Current Outpatient Medications:   •  amLODIPine (NORVASC) 5 MG tablet, Take 5 mg by mouth Daily., Disp: , Rfl:   •  aspirin 81 MG EC tablet, Take 81 mg by mouth Daily. 2 tabs of the morning., Disp: , Rfl:   •  atorvastatin (LIPITOR) 40 MG tablet, Take 40 mg by mouth Daily., Disp: , Rfl:   •  carvedilol (COREG) 12.5 MG tablet, TAKE ONE TABLET BY MOUTH TWO TIMES A DAY FOR THE HEART OR BLOOD PRESSURE, Disp: 180 tablet, Rfl: 0  •  dexlansoprazole (DEXILANT) 60 MG capsule, Take 1 capsule by mouth Daily., Disp: 30 capsule, Rfl: 5  •  exenatide er (BYDUREON) 2 MG pen-injector injection, Inject 2 mg under the skin into the appropriate area as directed 1 (One) Time Per Week., Disp: , Rfl:   •  glimepiride (AMARYL) 4 MG tablet, Take 4 mg by mouth Every Morning Before Breakfast., Disp: , Rfl:   •  hydrOXYzine (ATARAX) 25 MG tablet, Take 25 mg by mouth 3 (Three) Times a Day As Needed for Itching., Disp: , Rfl:   •  linaclotide (LINZESS) 290 MCG capsule capsule, Take 290 mcg by mouth Every Morning Before Breakfast., Disp: , Rfl:   •  losartan (COZAAR) 50 MG tablet, Take  75 mg by mouth Every Night. Takes 1 & 1/2 of 50mg, Disp: , Rfl:   •  metFORMIN ER (GLUCOPHAGE-XR) 500 MG 24 hr tablet, Take 1,000 mg by mouth 2 (Two) Times a Day., Disp: , Rfl:   •  QUEtiapine (SEROquel) 200 MG tablet, Take 200 mg by mouth every night., Disp: , Rfl:   •  traMADol (ULTRAM) 50 MG tablet, Take 50 mg by mouth 2 (two) times a day., Disp: , Rfl:     The following portions of the patient's history were reviewed and updated as appropriate: allergies, current medications, past family history, past medical history, past social history, past surgical history and problem list.    Social History     Tobacco Use   • Smoking status: Current Every Day Smoker     Packs/day: 0.50     Years: 25.00     Pack years: 12.50     Types: Cigarettes   • Smokeless tobacco: Never Used   Substance Use Topics   • Alcohol use: No   • Drug use: No         Objective   There were no vitals filed for this visit.  There is no height or weight on file to calculate BMI.    Physical Exam    Lab Results   Component Value Date     11/01/2021    K 4.2 11/01/2021     11/01/2021    CO2 24.8 11/01/2021    BUN 16 11/01/2021    CREATININE 0.87 11/01/2021    GLUCOSE 147 (H) 11/01/2021    CALCIUM 9.6 11/01/2021    AST 19 06/27/2019    ALT 20 06/27/2019    ALKPHOS 79 06/27/2019    LABIL2 1.6 12/19/2015     Lab Results   Component Value Date    CKTOTAL 83 04/04/2018     Lab Results   Component Value Date    WBC 7.58 06/27/2019    HGB 12.5 06/27/2019    HCT 37.8 06/27/2019     06/27/2019     No results found for: INR  Lab Results   Component Value Date    MG 2.1 06/27/2019     Lab Results   Component Value Date    TSH 3.293 10/24/2017      No results found for: BNP    During this visit the following were done:  Labs Reviewed []    Labs Ordered []    Radiology Reports Reviewed []    Radiology Ordered []    PCP Records Reviewed []    Referring Provider Records Reviewed []    ER Records Reviewed []    Hospital Records Reviewed []     History Obtained From Family []    Radiology Images Reviewed []    Other Reviewed []    Records Requested []         ECG 12 Lead    Date/Time: 8/10/2022 9:29 AM  Performed by: Jayro Marquez PA-C  Authorized by: Jayro Marquez PA-C               Assessment & Plan   No diagnosis found.         Recommendations:  1.       No follow-ups on file.    As always, I appreciate very much the opportunity to participate in the cardiovascular care of your patients.      With Best Regards,    Jayro Marquez PA-C

## 2022-08-10 NOTE — PROGRESS NOTES
Kendrick Lemus MD  Valerie Stroud  1950  08/10/2022    Patient Active Problem List   Diagnosis   • Essential hypertension   • Dyslipidemia   • Dyspnea, unspecified   • Early satiety   • Dyspepsia   • Epigastric abdominal pain   • RUQ abdominal pain   • Weight gain, abnormal   • History of colon polyps   • Constipation   • Type 2 diabetes mellitus without complication (HCC)   • Bilateral leg edema with no clinical signs of heart failure.   • Palpitation   • Gastroesophageal reflux disease   • Black stool   • Bloating   • Poor appetite       Dear Kendrick Lemus MD:    Subjective     History of Present Illness:    Chief Complaint   Patient presents with   • Palpitations     1 year follow   • Med Management     List provided       Valerie Stroud is a pleasant 72 y.o. female with a past medical history significant for essential hypertension and dyslipidemia. She comes in today for for routine cardiology follow up.     Valerie reports that she is going through a more stressfull time as her daughter has rectal cancer and she has been helping with caring for her. Otherwise she is doing well clinically she still walks daily with goal of 3 miles. Patient denies any chest pain, shortness of breath, palpitations, dizziness, syncope or near syncope.     No Known Allergies:      Current Outpatient Medications:   •  amLODIPine (NORVASC) 5 MG tablet, Take 5 mg by mouth Daily., Disp: , Rfl:   •  aspirin 81 MG EC tablet, Take 81 mg by mouth Daily. 2 tabs of the morning., Disp: , Rfl:   •  atorvastatin (LIPITOR) 40 MG tablet, Take 40 mg by mouth Daily., Disp: , Rfl:   •  carvedilol (COREG) 12.5 MG tablet, TAKE ONE TABLET BY MOUTH TWO TIMES A DAY FOR THE HEART OR BLOOD PRESSURE, Disp: 180 tablet, Rfl: 0  •  dexlansoprazole (DEXILANT) 60 MG capsule, Take 1 capsule by mouth Daily., Disp: 30 capsule, Rfl: 5  •  exenatide er (BYDUREON) 2 MG pen-injector injection, Inject 2 mg under the skin into the appropriate area  "as directed 1 (One) Time Per Week., Disp: , Rfl:   •  glimepiride (AMARYL) 4 MG tablet, Take 4 mg by mouth Every Morning Before Breakfast., Disp: , Rfl:   •  hydrOXYzine (ATARAX) 25 MG tablet, Take 25 mg by mouth 3 (Three) Times a Day As Needed for Itching., Disp: , Rfl:   •  linaclotide (LINZESS) 290 MCG capsule capsule, Take 290 mcg by mouth Every Morning Before Breakfast., Disp: , Rfl:   •  losartan (COZAAR) 50 MG tablet, Take 75 mg by mouth Every Night. Takes 1 & 1/2 of 50mg, Disp: , Rfl:   •  metFORMIN ER (GLUCOPHAGE-XR) 500 MG 24 hr tablet, Take 1,000 mg by mouth 2 (Two) Times a Day., Disp: , Rfl:   •  QUEtiapine (SEROquel) 200 MG tablet, Take 200 mg by mouth every night., Disp: , Rfl:   •  traMADol (ULTRAM) 50 MG tablet, Take 50 mg by mouth 2 (two) times a day., Disp: , Rfl:     The following portions of the patient's history were reviewed and updated as appropriate: allergies, current medications, past family history, past medical history, past social history, past surgical history and problem list.    Social History     Tobacco Use   • Smoking status: Current Every Day Smoker     Packs/day: 0.50     Years: 25.00     Pack years: 12.50     Types: Cigarettes   • Smokeless tobacco: Never Used   Substance Use Topics   • Alcohol use: No   • Drug use: No         Objective   Vitals:    08/10/22 0930   BP: 119/72   Pulse: 69   Resp: 16   Weight: 71.2 kg (157 lb)   Height: 154.9 cm (61\")     Body mass index is 29.66 kg/m².    Constitutional:       General: Not in acute distress.     Appearance: Healthy appearance. Well-developed and not in distress. Not diaphoretic.   Eyes:      Conjunctiva/sclera: Conjunctivae normal.      Pupils: Pupils are equal, round, and reactive to light.   HENT:      Head: Normocephalic and atraumatic.   Neck:      Vascular: No carotid bruit or JVD.   Pulmonary:      Effort: Pulmonary effort is normal. No respiratory distress.      Breath sounds: Normal breath sounds.   Cardiovascular:      " Normal rate. Regular rhythm.   Skin:     General: Skin is cool.   Neurological:      Mental Status: Alert, oriented to person, place, and time and oriented to person, place and time.         Lab Results   Component Value Date     11/01/2021    K 4.2 11/01/2021     11/01/2021    CO2 24.8 11/01/2021    BUN 16 11/01/2021    CREATININE 0.87 11/01/2021    GLUCOSE 147 (H) 11/01/2021    CALCIUM 9.6 11/01/2021    AST 19 06/27/2019    ALT 20 06/27/2019    ALKPHOS 79 06/27/2019    LABIL2 1.6 12/19/2015     Lab Results   Component Value Date    CKTOTAL 83 04/04/2018     Lab Results   Component Value Date    WBC 7.58 06/27/2019    HGB 12.5 06/27/2019    HCT 37.8 06/27/2019     06/27/2019     No results found for: INR  Lab Results   Component Value Date    MG 2.1 06/27/2019     Lab Results   Component Value Date    TSH 3.293 10/24/2017      No results found for: BNP    During this visit the following were done:  Labs Reviewed []    Labs Ordered []    Radiology Reports Reviewed []    Radiology Ordered []    PCP Records Reviewed []    Referring Provider Records Reviewed []    ER Records Reviewed []    Hospital Records Reviewed []    History Obtained From Family []    Radiology Images Reviewed []    Other Reviewed []    Records Requested []         ECG 12 Lead    Date/Time: 8/10/2022 9:29 AM  Performed by: Jayro Marquez PA-C  Authorized by: Jayro Marquez PA-C   Comparison: compared with previous ECG   Similar to previous ECG  Rhythm: sinus rhythm  Conduction: conduction normal  ST Segments: ST segments normal    Clinical impression: normal ECG            Assessment & Plan    Diagnosis Plan   1. Essential hypertension     2. Dyslipidemia              Recommendations:  1. Essential hypertension  1. Controlled, continue current regimen.   2. Encouraged her to continue walk regularly which she already does.       Return in about 1 year (around 8/10/2023).    As always, I appreciate very much the opportunity  to participate in the cardiovascular care of your patients.      With Best Regards,    Jayro Marquez PA-C

## 2023-04-13 ENCOUNTER — TRANSCRIBE ORDERS (OUTPATIENT)
Dept: ADMINISTRATIVE | Facility: HOSPITAL | Age: 73
End: 2023-04-13
Payer: MEDICARE

## 2023-04-13 DIAGNOSIS — Z12.31 SCREENING MAMMOGRAM FOR BREAST CANCER: Primary | ICD-10-CM

## 2023-05-15 ENCOUNTER — HOSPITAL ENCOUNTER (OUTPATIENT)
Dept: MAMMOGRAPHY | Facility: HOSPITAL | Age: 73
Discharge: HOME | End: 2023-05-15
Admitting: FAMILY MEDICINE
Payer: MEDICARE

## 2023-05-15 DIAGNOSIS — Z12.31 SCREENING MAMMOGRAM FOR BREAST CANCER: ICD-10-CM

## 2023-05-15 PROCEDURE — 77067 SCR MAMMO BI INCL CAD: CPT

## 2023-05-15 PROCEDURE — 77063 BREAST TOMOSYNTHESIS BI: CPT

## 2023-08-06 ENCOUNTER — APPOINTMENT (OUTPATIENT)
Dept: CT IMAGING | Facility: HOSPITAL | Age: 73
End: 2023-08-06
Payer: MEDICARE

## 2023-08-06 ENCOUNTER — APPOINTMENT (OUTPATIENT)
Dept: GENERAL RADIOLOGY | Facility: HOSPITAL | Age: 73
End: 2023-08-06
Payer: MEDICARE

## 2023-08-06 ENCOUNTER — HOSPITAL ENCOUNTER (EMERGENCY)
Facility: HOSPITAL | Age: 73
Discharge: HOME | End: 2023-08-06
Payer: MEDICARE

## 2023-08-06 VITALS
OXYGEN SATURATION: 92 % | WEIGHT: 151 LBS | DIASTOLIC BLOOD PRESSURE: 75 MMHG | HEART RATE: 88 BPM | HEIGHT: 63 IN | SYSTOLIC BLOOD PRESSURE: 143 MMHG | TEMPERATURE: 98.8 F | BODY MASS INDEX: 26.75 KG/M2 | RESPIRATION RATE: 18 BRPM

## 2023-08-06 DIAGNOSIS — S16.1XXA ACUTE STRAIN OF NECK MUSCLE, INITIAL ENCOUNTER: Primary | ICD-10-CM

## 2023-08-06 PROCEDURE — 72040 X-RAY EXAM NECK SPINE 2-3 VW: CPT | Performed by: RADIOLOGY

## 2023-08-06 PROCEDURE — 72040 X-RAY EXAM NECK SPINE 2-3 VW: CPT

## 2023-08-06 PROCEDURE — 93005 ELECTROCARDIOGRAM TRACING: CPT | Performed by: EMERGENCY MEDICINE

## 2023-08-06 PROCEDURE — 72125 CT NECK SPINE W/O DYE: CPT

## 2023-08-06 PROCEDURE — 99284 EMERGENCY DEPT VISIT MOD MDM: CPT

## 2023-08-06 PROCEDURE — 93010 ELECTROCARDIOGRAM REPORT: CPT | Performed by: INTERNAL MEDICINE

## 2023-08-06 RX ORDER — NAPROXEN 500 MG/1
500 TABLET ORAL 2 TIMES DAILY PRN
Qty: 12 TABLET | Refills: 0 | Status: SHIPPED | OUTPATIENT
Start: 2023-08-06 | End: 2023-08-09

## 2023-08-06 RX ORDER — HYDROCODONE BITARTRATE AND ACETAMINOPHEN 5; 325 MG/1; MG/1
1 TABLET ORAL ONCE
Status: COMPLETED | OUTPATIENT
Start: 2023-08-06 | End: 2023-08-06

## 2023-08-06 RX ADMIN — HYDROCODONE BITARTRATE AND ACETAMINOPHEN 1 TABLET: 5; 325 TABLET ORAL at 21:19

## 2023-08-06 NOTE — ED TRIAGE NOTES
MEDICAL SCREENING:    Reason for Visit: Neck Pain    Patient initially seen in triage.  The patient was advised further evaluation and diagnostic testing will be needed, some of the treatment and testing will be initiated in the lobby in order to begin the process.  The patient will be returned to the waiting area for the time being and possibly be re-assessed by a subsequent ED provider.  The patient will be brought back to the treatment area in as timely manner as possible.

## 2023-08-08 LAB
QT INTERVAL: 364 MS
QTC INTERVAL: 445 MS

## 2023-08-09 ENCOUNTER — OFFICE VISIT (OUTPATIENT)
Dept: CARDIOLOGY | Facility: CLINIC | Age: 73
End: 2023-08-09
Payer: MEDICARE

## 2023-08-09 VITALS
DIASTOLIC BLOOD PRESSURE: 70 MMHG | RESPIRATION RATE: 16 BRPM | HEIGHT: 62 IN | HEART RATE: 69 BPM | OXYGEN SATURATION: 94 % | SYSTOLIC BLOOD PRESSURE: 110 MMHG | WEIGHT: 158.8 LBS | BODY MASS INDEX: 29.22 KG/M2

## 2023-08-09 DIAGNOSIS — E78.5 DYSLIPIDEMIA: ICD-10-CM

## 2023-08-09 DIAGNOSIS — E11.9 TYPE 2 DIABETES MELLITUS WITHOUT COMPLICATION, WITHOUT LONG-TERM CURRENT USE OF INSULIN: ICD-10-CM

## 2023-08-09 DIAGNOSIS — I10 ESSENTIAL HYPERTENSION: Primary | ICD-10-CM

## 2023-08-09 RX ORDER — LORATADINE 10 MG/1
CAPSULE, LIQUID FILLED ORAL
COMMUNITY

## 2023-08-09 RX ORDER — ESTRADIOL 0.1 MG/G
2 CREAM VAGINAL DAILY
COMMUNITY

## 2023-08-09 RX ORDER — LOSARTAN POTASSIUM 100 MG/1
100 TABLET ORAL NIGHTLY
Qty: 30 TABLET | Refills: 2 | Status: SHIPPED | OUTPATIENT
Start: 2023-08-09

## 2023-08-09 RX ORDER — ALPRAZOLAM 0.25 MG/1
0.25 TABLET ORAL 2 TIMES DAILY PRN
COMMUNITY

## 2023-08-09 NOTE — PROGRESS NOTES
Kendrick Lemus MD  Valerie Stroud  1950 08/09/2023    Patient Active Problem List   Diagnosis    Essential hypertension    Dyslipidemia    Dyspnea, unspecified    Early satiety    Dyspepsia    Epigastric abdominal pain    RUQ abdominal pain    Weight gain, abnormal    History of colon polyps    Constipation    Type 2 diabetes mellitus without complication    Bilateral leg edema with no clinical signs of heart failure.    Palpitation    Gastroesophageal reflux disease    Black stool    Bloating    Poor appetite       Dear Kendrick Lemus MD:    Subjective     History of Present Illness:    Chief Complaint   Patient presents with    Follow-up     1 year    Edema     LE    Med Management     List provided       Valerie Stroud is a pleasant 73 y.o. female with a past medical history significant for essential hypertension and dyslipidemia. She comes in today for for routine cardiology follow up.      Patient denies any chest pain, shortness of breath, palpitations, dizziness, syncope or near syncope. Her only compliant today is just pedal edema, although denies orthopena or PND.       No Known Allergies:      Current Outpatient Medications:     ALPRAZolam (XANAX) 0.25 MG tablet, Take 1 tablet by mouth 2 (Two) Times a Day As Needed for Anxiety., Disp: , Rfl:     atorvastatin (LIPITOR) 40 MG tablet, Take 1 tablet by mouth Daily., Disp: , Rfl:     carvedilol (COREG) 12.5 MG tablet, TAKE ONE TABLET BY MOUTH TWO TIMES A DAY FOR THE HEART OR BLOOD PRESSURE, Disp: 180 tablet, Rfl: 0    empagliflozin (Jardiance) 10 MG tablet tablet, Take  by mouth Daily., Disp: , Rfl:     estradiol (ESTRACE) 0.1 MG/GM vaginal cream, Insert 2 g into the vagina Daily., Disp: , Rfl:     glimepiride (AMARYL) 4 MG tablet, Take 1 tablet by mouth Every Morning Before Breakfast., Disp: , Rfl:     Loratadine (Claritin) 10 MG capsule, Take  by mouth., Disp: , Rfl:     losartan (COZAAR) 100 MG tablet, Take 1 tablet by mouth Every  "Night. Takes 1 & 1/2 of 50mg, Disp: 30 tablet, Rfl: 2    QUEtiapine (SEROquel) 200 MG tablet, Take 1 tablet by mouth Every Night., Disp: , Rfl:     traMADol (ULTRAM) 50 MG tablet, Take 1 tablet by mouth 2 (Two) Times a Day., Disp: , Rfl:     aspirin 81 MG EC tablet, Take 81 mg by mouth Daily. 2 tabs of the morning. (Patient not taking: Reported on 8/9/2023), Disp: , Rfl:     The following portions of the patient's history were reviewed and updated as appropriate: allergies, current medications, past family history, past medical history, past social history, past surgical history and problem list.    Social History     Tobacco Use    Smoking status: Every Day     Packs/day: 0.50     Years: 25.00     Pack years: 12.50     Types: Cigarettes    Smokeless tobacco: Never   Substance Use Topics    Alcohol use: No    Drug use: No         Objective   Vitals:    08/09/23 0949   BP: 110/70   Pulse: 69   Resp: 16   SpO2: 94%   Weight: 72 kg (158 lb 12.8 oz)   Height: 157.5 cm (62\")     Body mass index is 29.04 kg/mý.    Constitutional:       General: Not in acute distress.     Appearance: Healthy appearance. Well-developed and not in distress. Not diaphoretic.   Eyes:      Conjunctiva/sclera: Conjunctivae normal.      Pupils: Pupils are equal, round, and reactive to light.   HENT:      Head: Normocephalic and atraumatic.   Neck:      Vascular: No carotid bruit or JVD.   Pulmonary:      Effort: Pulmonary effort is normal. No respiratory distress.      Breath sounds: Normal breath sounds.   Cardiovascular:      Normal rate. Regular rhythm.   Edema:     Pretibial: bilateral 1+ edema of the pretibial area.     Ankle: bilateral 1+ edema of the ankle.     Feet: bilateral 1+ edema of the feet.  Skin:     General: Skin is cool.   Neurological:      Mental Status: Alert, oriented to person, place, and time and oriented to person, place and time.       Lab Results   Component Value Date     11/01/2021    K 4.2 11/01/2021     " 11/01/2021    CO2 24.8 11/01/2021    BUN 16 11/01/2021    CREATININE 0.87 11/01/2021    GLUCOSE 147 (H) 11/01/2021    CALCIUM 9.6 11/01/2021    AST 19 06/27/2019    ALT 20 06/27/2019    ALKPHOS 79 06/27/2019    LABIL2 1.6 12/19/2015     Lab Results   Component Value Date    CKTOTAL 83 04/04/2018     Lab Results   Component Value Date    WBC 7.58 06/27/2019    HGB 12.5 06/27/2019    HCT 37.8 06/27/2019     06/27/2019     No results found for: INR  Lab Results   Component Value Date    MG 2.1 06/27/2019     Lab Results   Component Value Date    TSH 3.293 10/24/2017      No results found for: BNP    During this visit the following were done:  Labs Reviewed []    Labs Ordered []    Radiology Reports Reviewed []    Radiology Ordered []    PCP Records Reviewed []    Referring Provider Records Reviewed []    ER Records Reviewed []    Hospital Records Reviewed []    History Obtained From Family []    Radiology Images Reviewed []    Other Reviewed []    Records Requested []         ECG 12 Lead    Date/Time: 8/9/2023 9:52 AM  Performed by: Jayro Marquez PA-C  Authorized by: Jayro Marquez PA-C   Comparison: compared with previous ECG   Similar to previous ECG  Rhythm: sinus rhythm  Conduction: conduction normal  ST Segments: ST segments normal    Clinical impression: normal ECG        Assessment & Plan    Diagnosis Plan   1. Essential hypertension  Basic Metabolic Panel      2. Dyslipidemia        3. Type 2 diabetes mellitus without complication, without long-term current use of insulin                 Recommendations:  Pedal edena  Likely being cause and/or made worse from amlodipine, I will stop this and increase losartan to 100 mg dailly. I did discuss with her about possibly continuing this despite pedal edema as it is mild and BP is controlled, however, she wishes to try an alternative.   Check  bmp in 2 weeks.   Dyslipidemia  Continue lipitor.     Return in about 3 months (around 11/9/2023).    As always,  I appreciate very much the opportunity to participate in the cardiovascular care of your patients.      With Best Regards,    Jayro Marquez PA-C

## 2023-08-11 NOTE — ED PROVIDER NOTES
Subjective   History of Present Illness  Pt was cleaning a ceiling fan and her neck started hurting     History provided by:  Patient  Neck Pain  Pain location:  Generalized neck  Quality:  Aching  Pain radiates to:  Does not radiate  Pain severity:  Moderate  Pain is:  Same all the time  Onset quality:  Sudden  Duration:  1 hour  Timing:  Constant  Chronicity:  New  Relieved by:  Nothing  Worsened by:  Bending  Ineffective treatments:  None tried  Associated symptoms: no bladder incontinence and no bowel incontinence      Review of Systems   Gastrointestinal:  Negative for bowel incontinence.   Genitourinary:  Negative for bladder incontinence.   Musculoskeletal:  Positive for neck pain.     Past Medical History:   Diagnosis Date    Arthritis     DMII (diabetes mellitus, type 2)     Dyslipidemia     Elevated cholesterol     GERD (gastroesophageal reflux disease)     HTN (hypertension)     Hyperlipidemia     Type 1 diabetes mellitus 12/20/2017    Type 2 diabetes mellitus with complication 12/20/2017       No Known Allergies    Past Surgical History:   Procedure Laterality Date    BLADDER SUSPENSION      CATARACT EXTRACTION W/ INTRAOCULAR LENS  IMPLANT, BILATERAL      CHOLECYSTECTOMY  11/04/2015    laparoscopic    COLONOSCOPY  03/30/2009    COLONOSCOPY N/A 12/1/2017    Procedure: COLONOSCOPY FOR SCREENING CPT CODE: ;  Surgeon: Deshawn Ibanez III, MD;  Location: [facility] OR;  Service:     ENDOSCOPY  02/2002    ENDOSCOPY N/A 12/1/2017    Procedure: ESOPHAGOGASTRODUODENOSCOPY WITH BIOPSY CPT CODE: 59711;  Surgeon: Deshawn Ibanez III, MD;  Location: [facility] OR;  Service:     ENDOSCOPY N/A 7/12/2019    Procedure: ESOPHAGOGASTRODUODENOSCOPY WITH BIOPSY CPT CODE: 27107;  Surgeon: Néstor Logan MD;  Location: [facility] OR;  Service: Gastroenterology    KNEE SURGERY Bilateral     SUBTOTAL HYSTERECTOMY      VARICOSE VEIN SURGERY         Family History   Problem Relation Age of Onset    Coronary artery disease  Mother     Coronary artery disease Father 69        fatal MI age 69    Heart attack Father     Heart defect Sister         pacemaker    Liver cancer Sister     Heart attack Brother 29        s/p CAGB    Coronary artery disease Brother         fatal MI, age 50    Heart attack Brother 39    Breast cancer Neg Hx        Social History     Socioeconomic History    Marital status:    Tobacco Use    Smoking status: Every Day     Packs/day: 0.50     Years: 25.00     Pack years: 12.50     Types: Cigarettes    Smokeless tobacco: Never   Substance and Sexual Activity    Alcohol use: No    Drug use: No    Sexual activity: Defer           Objective   Physical Exam  Vitals and nursing note reviewed.   Constitutional:       Appearance: She is well-developed.   HENT:      Head: Normocephalic.   Cardiovascular:      Rate and Rhythm: Normal rate and regular rhythm.   Pulmonary:      Effort: Pulmonary effort is normal.      Breath sounds: Normal breath sounds.   Abdominal:      General: Bowel sounds are normal.      Palpations: Abdomen is soft.      Tenderness: There is no abdominal tenderness.   Musculoskeletal:         General: Normal range of motion.      Cervical back: Neck supple. Pain with movement and muscular tenderness present. Decreased range of motion.   Skin:     General: Skin is warm and dry.   Neurological:      Mental Status: She is alert and oriented to person, place, and time.   Psychiatric:         Behavior: Behavior normal.         Thought Content: Thought content normal.         Judgment: Judgment normal.       Procedures           ED Course  ED Course as of 08/11/23 0705   Sun Aug 06, 2023   2032 EKG shows sinus rhythm, rate 90.  AK interval 160, QRS duration 82, QTc 445 ms.  No evidence for STEMI.  Electronically signed by Nigel Clark MD, 08/06/23, 8:33 PM EDT.   [CM]      ED Course User Index  [CM] Nigel Clark MD      CT Cervical Spine Without Contrast   Final Result       No acute  fracture or soft tissue abnormality.       This report was finalized on 8/6/2023 9:33 PM by Gabbi Fontana MD.          XR Spine Cervical 3 View   Final Result   Impression:       No acute bony process.       This report was finalized on 8/6/2023 9:05 PM by Farideh Hodge MD.                                                Medical Decision Making  Problems Addressed:  Acute strain of neck muscle, initial encounter: complicated acute illness or injury    Amount and/or Complexity of Data Reviewed  Radiology: ordered.  ECG/medicine tests: ordered.    Risk  Prescription drug management.        Final diagnoses:   Acute strain of neck muscle, initial encounter       ED Disposition  ED Disposition       ED Disposition   Discharge    Condition   Stable    Comment   --               Kendrick Lemus MD  [address] [place], [place] [id number] [phone]    In 2 days           Medication List      No changes were made to your prescriptions during this visit.            Sowmya Tipton PA  08/11/23 0708

## 2023-11-08 ENCOUNTER — OFFICE VISIT (OUTPATIENT)
Dept: CARDIOLOGY | Facility: CLINIC | Age: 73
End: 2023-11-08
Payer: MEDICARE

## 2023-11-08 VITALS
BODY MASS INDEX: 28.89 KG/M2 | WEIGHT: 157 LBS | DIASTOLIC BLOOD PRESSURE: 77 MMHG | HEIGHT: 62 IN | OXYGEN SATURATION: 95 % | HEART RATE: 77 BPM | SYSTOLIC BLOOD PRESSURE: 139 MMHG

## 2023-11-08 DIAGNOSIS — E78.5 DYSLIPIDEMIA: ICD-10-CM

## 2023-11-08 DIAGNOSIS — I10 ESSENTIAL HYPERTENSION: Primary | ICD-10-CM

## 2023-11-08 PROCEDURE — 1159F MED LIST DOCD IN RCRD: CPT | Performed by: PHYSICIAN ASSISTANT

## 2023-11-08 PROCEDURE — 1160F RVW MEDS BY RX/DR IN RCRD: CPT | Performed by: PHYSICIAN ASSISTANT

## 2023-11-08 PROCEDURE — 3078F DIAST BP <80 MM HG: CPT | Performed by: PHYSICIAN ASSISTANT

## 2023-11-08 PROCEDURE — 3075F SYST BP GE 130 - 139MM HG: CPT | Performed by: PHYSICIAN ASSISTANT

## 2023-11-08 PROCEDURE — 99213 OFFICE O/P EST LOW 20 MIN: CPT | Performed by: PHYSICIAN ASSISTANT

## 2023-11-08 NOTE — PROGRESS NOTES
Kendrick Lemus MD  Valerie Stroud  1950 11/08/2023    Patient Active Problem List   Diagnosis    Essential hypertension    Dyslipidemia    Dyspnea, unspecified    Early satiety    Dyspepsia    Epigastric abdominal pain    RUQ abdominal pain    Weight gain, abnormal    History of colon polyps    Constipation    Type 2 diabetes mellitus without complication    Bilateral leg edema with no clinical signs of heart failure.    Palpitation    Gastroesophageal reflux disease    Black stool    Bloating    Poor appetite       Dear Kendrick Lemus MD:    Subjective     History of Present Illness:    Chief Complaint   Patient presents with    Med Management     List.        Valerie Stroud is a pleasant 73 y.o. female with a past medical history significant for essential hypertension and dyslipidemia. She comes in today for for routine cardiology follow up.       Valerie tells me that she has been doing well. She reports that her pedal has essentially resolved since stopping amlodipine. She does still walk 3-4 miles a day and denies any chest pains or significant shortness of breath during this exercise. She also denies any palpitations or syncope.     No Known Allergies:      Current Outpatient Medications:     ALPRAZolam (XANAX) 0.25 MG tablet, Take 1 tablet by mouth 2 (Two) Times a Day As Needed for Anxiety., Disp: , Rfl:     aspirin 81 MG EC tablet, Take 1 tablet by mouth Daily. 2 tabs of the morning., Disp: , Rfl:     atorvastatin (LIPITOR) 40 MG tablet, Take 1 tablet by mouth Daily., Disp: , Rfl:     carvedilol (COREG) 12.5 MG tablet, TAKE ONE TABLET BY MOUTH TWO TIMES A DAY FOR THE HEART OR BLOOD PRESSURE, Disp: 180 tablet, Rfl: 0    empagliflozin (Jardiance) 10 MG tablet tablet, Take 2.5 tablets by mouth Daily., Disp: , Rfl:     estradiol (ESTRACE) 0.1 MG/GM vaginal cream, Insert 2 g into the vagina Daily., Disp: , Rfl:     glimepiride (AMARYL) 4 MG tablet, Take 1 tablet by mouth Every Morning  "Before Breakfast., Disp: , Rfl:     Loratadine (Claritin) 10 MG capsule, Take  by mouth., Disp: , Rfl:     losartan (COZAAR) 100 MG tablet, Take 1 tablet by mouth Every Night. Takes 1 & 1/2 of 50mg, Disp: 30 tablet, Rfl: 2    QUEtiapine (SEROquel) 200 MG tablet, Take 1 tablet by mouth Every Night., Disp: , Rfl:     traMADol (ULTRAM) 50 MG tablet, Take 1 tablet by mouth 2 (Two) Times a Day., Disp: , Rfl:     The following portions of the patient's history were reviewed and updated as appropriate: allergies, current medications, past family history, past medical history, past social history, past surgical history and problem list.    Social History     Tobacco Use    Smoking status: Every Day     Packs/day: 0.50     Years: 25.00     Additional pack years: 0.00     Total pack years: 12.50     Types: Cigarettes    Smokeless tobacco: Never   Vaping Use    Vaping Use: Never used   Substance Use Topics    Alcohol use: No    Drug use: No         Objective   Vitals:    11/08/23 1010   BP: 139/77   BP Location: Left arm   Patient Position: Sitting   Cuff Size: Adult   Pulse: 77   SpO2: 95%   Weight: 71.2 kg (157 lb)   Height: 157.5 cm (62\")     Body mass index is 28.72 kg/m².    ROS    Constitutional:       General: Not in acute distress.     Appearance: Healthy appearance. Well-developed and not in distress. Not diaphoretic.   Eyes:      Conjunctiva/sclera: Conjunctivae normal.      Pupils: Pupils are equal, round, and reactive to light.   HENT:      Head: Normocephalic and atraumatic.   Neck:      Vascular: No carotid bruit or JVD.   Pulmonary:      Effort: Pulmonary effort is normal. No respiratory distress.      Breath sounds: Normal breath sounds.   Cardiovascular:      Normal rate. Regular rhythm.   Edema:     Pretibial: bilateral 1+ edema of the pretibial area.     Ankle: bilateral 1+ edema of the ankle.     Feet: bilateral 1+ edema of the feet.  Skin:     General: Skin is cool.   Neurological:      Mental Status: " "Alert, oriented to person, place, and time and oriented to person, place and time.       Lab Results   Component Value Date     11/01/2021    K 4.2 11/01/2021     11/01/2021    CO2 24.8 11/01/2021    BUN 16 11/01/2021    CREATININE 0.87 11/01/2021    GLUCOSE 147 (H) 11/01/2021    CALCIUM 9.6 11/01/2021    AST 19 06/27/2019    ALT 20 06/27/2019    ALKPHOS 79 06/27/2019    LABIL2 1.6 12/19/2015     Lab Results   Component Value Date    CKTOTAL 83 04/04/2018     Lab Results   Component Value Date    WBC 7.58 06/27/2019    HGB 12.5 06/27/2019    HCT 37.8 06/27/2019     06/27/2019     No results found for: \"INR\"  Lab Results   Component Value Date    MG 2.1 06/27/2019     Lab Results   Component Value Date    TSH 3.293 10/24/2017      No results found for: \"BNP\"    During this visit the following were done:  Labs Reviewed []    Labs Ordered []    Radiology Reports Reviewed []    Radiology Ordered []    PCP Records Reviewed []    Referring Provider Records Reviewed []    ER Records Reviewed []    Hospital Records Reviewed []    History Obtained From Family []    Radiology Images Reviewed []    Other Reviewed []    Records Requested []       Procedures    Assessment & Plan    Diagnosis Plan   1. Essential hypertension        2. Dyslipidemia                 Recommendations:  Essential hypertension  Controlled, continue coreg and losartan.   Dyslipidemia  Will try to get recent lipid panel from pcp.     Return in about 6 months (around 5/8/2024).    As always, I appreciate very much the opportunity to participate in the cardiovascular care of your patients.      With Best Regards,    Jayro Marquez PA-C          "

## 2023-11-09 ENCOUNTER — TELEPHONE (OUTPATIENT)
Dept: CARDIOLOGY | Facility: CLINIC | Age: 73
End: 2023-11-09
Payer: MEDICARE

## 2023-11-09 NOTE — TELEPHONE ENCOUNTER
----- Message from Jayro Marquez PA-C sent at 11/8/2023 11:09 AM EST -----  Can we check to see if she has a recent lipid panel.

## 2024-05-08 ENCOUNTER — OFFICE VISIT (OUTPATIENT)
Dept: CARDIOLOGY | Facility: CLINIC | Age: 74
End: 2024-05-08
Payer: MEDICARE

## 2024-05-08 VITALS
DIASTOLIC BLOOD PRESSURE: 84 MMHG | WEIGHT: 153 LBS | HEART RATE: 76 BPM | BODY MASS INDEX: 28.16 KG/M2 | OXYGEN SATURATION: 95 % | HEIGHT: 62 IN | SYSTOLIC BLOOD PRESSURE: 142 MMHG

## 2024-05-08 DIAGNOSIS — I10 ESSENTIAL HYPERTENSION: Primary | ICD-10-CM

## 2024-05-08 DIAGNOSIS — I20.89 ANGINAL EQUIVALENT: ICD-10-CM

## 2024-05-08 PROCEDURE — 3079F DIAST BP 80-89 MM HG: CPT | Performed by: PHYSICIAN ASSISTANT

## 2024-05-08 PROCEDURE — 99214 OFFICE O/P EST MOD 30 MIN: CPT | Performed by: PHYSICIAN ASSISTANT

## 2024-05-08 PROCEDURE — 1159F MED LIST DOCD IN RCRD: CPT | Performed by: PHYSICIAN ASSISTANT

## 2024-05-08 PROCEDURE — 93000 ELECTROCARDIOGRAM COMPLETE: CPT | Performed by: PHYSICIAN ASSISTANT

## 2024-05-08 PROCEDURE — 1160F RVW MEDS BY RX/DR IN RCRD: CPT | Performed by: PHYSICIAN ASSISTANT

## 2024-05-08 PROCEDURE — 3077F SYST BP >= 140 MM HG: CPT | Performed by: PHYSICIAN ASSISTANT

## 2024-05-08 RX ORDER — SEMAGLUTIDE 1.34 MG/ML
INJECTION, SOLUTION SUBCUTANEOUS WEEKLY
COMMUNITY

## 2024-05-26 ENCOUNTER — HOSPITAL ENCOUNTER (EMERGENCY)
Facility: HOSPITAL | Age: 74
Discharge: HOME | End: 2024-05-26
Attending: STUDENT IN AN ORGANIZED HEALTH CARE EDUCATION/TRAINING PROGRAM | Admitting: STUDENT IN AN ORGANIZED HEALTH CARE EDUCATION/TRAINING PROGRAM
Payer: MEDICARE

## 2024-05-26 ENCOUNTER — APPOINTMENT (OUTPATIENT)
Dept: CT IMAGING | Facility: HOSPITAL | Age: 74
End: 2024-05-26
Payer: MEDICARE

## 2024-05-26 VITALS
TEMPERATURE: 97.1 F | SYSTOLIC BLOOD PRESSURE: 101 MMHG | HEART RATE: 96 BPM | DIASTOLIC BLOOD PRESSURE: 84 MMHG | OXYGEN SATURATION: 94 % | RESPIRATION RATE: 16 BRPM | BODY MASS INDEX: 27.42 KG/M2 | HEIGHT: 62 IN | WEIGHT: 149 LBS

## 2024-05-26 DIAGNOSIS — K92.2 GASTROINTESTINAL HEMORRHAGE, UNSPECIFIED GASTROINTESTINAL HEMORRHAGE TYPE: ICD-10-CM

## 2024-05-26 DIAGNOSIS — N20.0 KIDNEY STONE: Primary | ICD-10-CM

## 2024-05-26 LAB
027 TOXIN: NORMAL
ADV 40+41 DNA STL QL NAA+NON-PROBE: NOT DETECTED
ALBUMIN SERPL-MCNC: 4.4 G/DL (ref 3.5–5.2)
ALBUMIN/GLOB SERPL: 1.6 G/DL
ALP SERPL-CCNC: 71 U/L (ref 39–117)
ALT SERPL W P-5'-P-CCNC: 40 U/L (ref 1–33)
ANION GAP SERPL CALCULATED.3IONS-SCNC: 10.2 MMOL/L (ref 5–15)
AST SERPL-CCNC: 31 U/L (ref 1–32)
ASTRO TYP 1-8 RNA STL QL NAA+NON-PROBE: NOT DETECTED
BASOPHILS # BLD AUTO: 0.04 10*3/MM3 (ref 0–0.2)
BASOPHILS NFR BLD AUTO: 0.3 % (ref 0–1.5)
BILIRUB SERPL-MCNC: 0.4 MG/DL (ref 0–1.2)
BILIRUB UR QL STRIP: NEGATIVE
BUN SERPL-MCNC: 18 MG/DL (ref 8–23)
BUN/CREAT SERPL: 23.7 (ref 7–25)
C CAYETANENSIS DNA STL QL NAA+NON-PROBE: NOT DETECTED
C COLI+JEJ+UPSA DNA STL QL NAA+NON-PROBE: NOT DETECTED
C DIFF TOX GENS STL QL NAA+PROBE: NEGATIVE
CALCIUM SPEC-SCNC: 9.6 MG/DL (ref 8.6–10.5)
CHLORIDE SERPL-SCNC: 106 MMOL/L (ref 98–107)
CLARITY UR: CLEAR
CO2 SERPL-SCNC: 23.8 MMOL/L (ref 22–29)
COLOR UR: YELLOW
CREAT SERPL-MCNC: 0.76 MG/DL (ref 0.57–1)
CRYPTOSP DNA STL QL NAA+NON-PROBE: NOT DETECTED
DEPRECATED RDW RBC AUTO: 39.9 FL (ref 37–54)
E HISTOLYT DNA STL QL NAA+NON-PROBE: NOT DETECTED
EAEC PAA PLAS AGGR+AATA ST NAA+NON-PRB: NOT DETECTED
EC STX1+STX2 GENES STL QL NAA+NON-PROBE: NOT DETECTED
EGFRCR SERPLBLD CKD-EPI 2021: 81.8 ML/MIN/1.73
EOSINOPHIL # BLD AUTO: 0.02 10*3/MM3 (ref 0–0.4)
EOSINOPHIL NFR BLD AUTO: 0.2 % (ref 0.3–6.2)
EPEC EAE GENE STL QL NAA+NON-PROBE: NOT DETECTED
ERYTHROCYTE [DISTWIDTH] IN BLOOD BY AUTOMATED COUNT: 12 % (ref 12.3–15.4)
ETEC LTA+ST1A+ST1B TOX ST NAA+NON-PROBE: NOT DETECTED
G LAMBLIA DNA STL QL NAA+NON-PROBE: NOT DETECTED
GLOBULIN UR ELPH-MCNC: 2.7 GM/DL
GLUCOSE SERPL-MCNC: 153 MG/DL (ref 65–99)
GLUCOSE UR STRIP-MCNC: ABNORMAL MG/DL
HCT VFR BLD AUTO: 43.8 % (ref 34–46.6)
HGB BLD-MCNC: 15.1 G/DL (ref 12–15.9)
HGB UR QL STRIP.AUTO: NEGATIVE
HOLD SPECIMEN: NORMAL
HOLD SPECIMEN: NORMAL
IMM GRANULOCYTES # BLD AUTO: 0.05 10*3/MM3 (ref 0–0.05)
IMM GRANULOCYTES NFR BLD AUTO: 0.4 % (ref 0–0.5)
KETONES UR QL STRIP: ABNORMAL
LEUKOCYTE ESTERASE UR QL STRIP.AUTO: NEGATIVE
LYMPHOCYTES # BLD AUTO: 1.17 10*3/MM3 (ref 0.7–3.1)
LYMPHOCYTES NFR BLD AUTO: 9.2 % (ref 19.6–45.3)
MCH RBC QN AUTO: 31.1 PG (ref 26.6–33)
MCHC RBC AUTO-ENTMCNC: 34.5 G/DL (ref 31.5–35.7)
MCV RBC AUTO: 90.3 FL (ref 79–97)
MONOCYTES # BLD AUTO: 0.82 10*3/MM3 (ref 0.1–0.9)
MONOCYTES NFR BLD AUTO: 6.5 % (ref 5–12)
NEUTROPHILS NFR BLD AUTO: 10.57 10*3/MM3 (ref 1.7–7)
NEUTROPHILS NFR BLD AUTO: 83.4 % (ref 42.7–76)
NITRITE UR QL STRIP: NEGATIVE
NOROVIRUS GI+II RNA STL QL NAA+NON-PROBE: NOT DETECTED
NRBC BLD AUTO-RTO: 0 /100 WBC (ref 0–0.2)
P SHIGELLOIDES DNA STL QL NAA+NON-PROBE: NOT DETECTED
PH UR STRIP.AUTO: 7.5 [PH] (ref 5–8)
PLATELET # BLD AUTO: 228 10*3/MM3 (ref 140–450)
PMV BLD AUTO: 9.3 FL (ref 6–12)
POTASSIUM SERPL-SCNC: 4 MMOL/L (ref 3.5–5.2)
PROT SERPL-MCNC: 7.1 G/DL (ref 6–8.5)
PROT UR QL STRIP: NEGATIVE
RBC # BLD AUTO: 4.85 10*6/MM3 (ref 3.77–5.28)
RVA RNA STL QL NAA+NON-PROBE: NOT DETECTED
S ENT+BONG DNA STL QL NAA+NON-PROBE: NOT DETECTED
SAPO I+II+IV+V RNA STL QL NAA+NON-PROBE: NOT DETECTED
SHIGELLA SP+EIEC IPAH ST NAA+NON-PROBE: NOT DETECTED
SODIUM SERPL-SCNC: 140 MMOL/L (ref 136–145)
SP GR UR STRIP: 1.02 (ref 1–1.03)
UROBILINOGEN UR QL STRIP: ABNORMAL
V CHOL+PARA+VUL DNA STL QL NAA+NON-PROBE: NOT DETECTED
V CHOLERAE DNA STL QL NAA+NON-PROBE: NOT DETECTED
WBC NRBC COR # BLD AUTO: 12.67 10*3/MM3 (ref 3.4–10.8)
WHOLE BLOOD HOLD COAG: NORMAL
WHOLE BLOOD HOLD SPECIMEN: NORMAL
Y ENTEROCOL DNA STL QL NAA+NON-PROBE: NOT DETECTED

## 2024-05-26 PROCEDURE — 85025 COMPLETE CBC W/AUTO DIFF WBC: CPT | Performed by: PHYSICIAN ASSISTANT

## 2024-05-26 PROCEDURE — 25010000002 ONDANSETRON PER 1 MG: Performed by: PHYSICIAN ASSISTANT

## 2024-05-26 PROCEDURE — 96375 TX/PRO/DX INJ NEW DRUG ADDON: CPT

## 2024-05-26 PROCEDURE — 63710000001 ONDANSETRON ODT 4 MG TABLET DISPERSIBLE: Performed by: STUDENT IN AN ORGANIZED HEALTH CARE EDUCATION/TRAINING PROGRAM

## 2024-05-26 PROCEDURE — 87507 IADNA-DNA/RNA PROBE TQ 12-25: CPT | Performed by: STUDENT IN AN ORGANIZED HEALTH CARE EDUCATION/TRAINING PROGRAM

## 2024-05-26 PROCEDURE — 25010000002 MORPHINE PER 10 MG: Performed by: EMERGENCY MEDICINE

## 2024-05-26 PROCEDURE — 36415 COLL VENOUS BLD VENIPUNCTURE: CPT

## 2024-05-26 PROCEDURE — P9612 CATHETERIZE FOR URINE SPEC: HCPCS

## 2024-05-26 PROCEDURE — 25810000003 SODIUM CHLORIDE 0.9 % SOLUTION: Performed by: PHYSICIAN ASSISTANT

## 2024-05-26 PROCEDURE — 74177 CT ABD & PELVIS W/CONTRAST: CPT

## 2024-05-26 PROCEDURE — 96374 THER/PROPH/DIAG INJ IV PUSH: CPT

## 2024-05-26 PROCEDURE — 25010000002 PROCHLORPERAZINE 10 MG/2ML SOLUTION: Performed by: EMERGENCY MEDICINE

## 2024-05-26 PROCEDURE — 87493 C DIFF AMPLIFIED PROBE: CPT | Performed by: STUDENT IN AN ORGANIZED HEALTH CARE EDUCATION/TRAINING PROGRAM

## 2024-05-26 PROCEDURE — 74177 CT ABD & PELVIS W/CONTRAST: CPT | Performed by: RADIOLOGY

## 2024-05-26 PROCEDURE — 99285 EMERGENCY DEPT VISIT HI MDM: CPT

## 2024-05-26 PROCEDURE — 81003 URINALYSIS AUTO W/O SCOPE: CPT | Performed by: PHYSICIAN ASSISTANT

## 2024-05-26 PROCEDURE — 25510000001 IOPAMIDOL 61 % SOLUTION: Performed by: STUDENT IN AN ORGANIZED HEALTH CARE EDUCATION/TRAINING PROGRAM

## 2024-05-26 PROCEDURE — 80053 COMPREHEN METABOLIC PANEL: CPT | Performed by: PHYSICIAN ASSISTANT

## 2024-05-26 RX ORDER — PRAMOXINE HYDROCHLORIDE 10 MG/G
AEROSOL, FOAM TOPICAL
Qty: 15 G | Refills: 0 | Status: SHIPPED | OUTPATIENT
Start: 2024-05-26

## 2024-05-26 RX ORDER — PROCHLORPERAZINE EDISYLATE 5 MG/ML
10 INJECTION INTRAMUSCULAR; INTRAVENOUS ONCE
Status: COMPLETED | OUTPATIENT
Start: 2024-05-26 | End: 2024-05-26

## 2024-05-26 RX ORDER — HYDROCODONE BITARTRATE AND ACETAMINOPHEN 5; 325 MG/1; MG/1
1 TABLET ORAL 4 TIMES DAILY PRN
Qty: 12 TABLET | Refills: 0 | Status: SHIPPED | OUTPATIENT
Start: 2024-05-26

## 2024-05-26 RX ORDER — HYDROCODONE BITARTRATE AND ACETAMINOPHEN 5; 325 MG/1; MG/1
1 TABLET ORAL ONCE
Status: DISCONTINUED | OUTPATIENT
Start: 2024-05-26 | End: 2024-05-26

## 2024-05-26 RX ORDER — HYDROCODONE BITARTRATE AND ACETAMINOPHEN 5; 325 MG/1; MG/1
1 TABLET ORAL EVERY 6 HOURS PRN
Status: DISCONTINUED | OUTPATIENT
Start: 2024-05-26 | End: 2024-05-26 | Stop reason: HOSPADM

## 2024-05-26 RX ORDER — HYDROCODONE BITARTRATE AND ACETAMINOPHEN 5; 325 MG/1; MG/1
1 TABLET ORAL ONCE
Status: COMPLETED | OUTPATIENT
Start: 2024-05-26 | End: 2024-05-26

## 2024-05-26 RX ORDER — ONDANSETRON 2 MG/ML
4 INJECTION INTRAMUSCULAR; INTRAVENOUS ONCE
Status: COMPLETED | OUTPATIENT
Start: 2024-05-26 | End: 2024-05-26

## 2024-05-26 RX ORDER — TAMSULOSIN HYDROCHLORIDE 0.4 MG/1
1 CAPSULE ORAL DAILY
Qty: 30 CAPSULE | Refills: 0 | Status: SHIPPED | OUTPATIENT
Start: 2024-05-26

## 2024-05-26 RX ORDER — ONDANSETRON 4 MG/1
4 TABLET, ORALLY DISINTEGRATING ORAL ONCE
Status: COMPLETED | OUTPATIENT
Start: 2024-05-26 | End: 2024-05-26

## 2024-05-26 RX ORDER — NALOXONE HYDROCHLORIDE 4 MG/.1ML
SPRAY NASAL
Qty: 2 EACH | Refills: 0 | Status: SHIPPED | OUTPATIENT
Start: 2024-05-26

## 2024-05-26 RX ADMIN — MORPHINE SULFATE 4 MG: 4 INJECTION, SOLUTION INTRAMUSCULAR; INTRAVENOUS at 15:56

## 2024-05-26 RX ADMIN — ONDANSETRON 4 MG: 4 TABLET, ORALLY DISINTEGRATING ORAL at 18:43

## 2024-05-26 RX ADMIN — PROCHLORPERAZINE EDISYLATE 10 MG: 5 INJECTION INTRAMUSCULAR; INTRAVENOUS at 15:56

## 2024-05-26 RX ADMIN — IOPAMIDOL 70 ML: 612 INJECTION, SOLUTION INTRAVENOUS at 14:48

## 2024-05-26 RX ADMIN — HYDROCODONE BITARTRATE AND ACETAMINOPHEN 1 TABLET: 5; 325 TABLET ORAL at 18:16

## 2024-05-26 RX ADMIN — ONDANSETRON 4 MG: 2 INJECTION INTRAMUSCULAR; INTRAVENOUS at 13:45

## 2024-05-26 RX ADMIN — SODIUM CHLORIDE 1000 ML: 9 INJECTION, SOLUTION INTRAVENOUS at 13:45

## 2024-05-26 NOTE — ED PROVIDER NOTES
Subjective   History of Present Illness  Presents via EMS from residence with c/o n/v/d that begain at approx 0600 today, pt reports bright red blood in stool approx 1 hour pta, also reports abd cramping.  Pt has PMHX of HTN, DM, GERD, Hyperlipidemia     History provided by:  Patient   used: No        Review of Systems    Past Medical History:   Diagnosis Date    Arthritis     DMII (diabetes mellitus, type 2)     Dyslipidemia     Elevated cholesterol     GERD (gastroesophageal reflux disease)     HTN (hypertension)     Hyperlipidemia     Type 1 diabetes mellitus 12/20/2017    Type 2 diabetes mellitus with complication 12/20/2017       No Known Allergies    Past Surgical History:   Procedure Laterality Date    BLADDER SUSPENSION      CATARACT EXTRACTION W/ INTRAOCULAR LENS  IMPLANT, BILATERAL      CHOLECYSTECTOMY  11/04/2015    laparoscopic    COLONOSCOPY  03/30/2009    COLONOSCOPY N/A 12/1/2017    Procedure: COLONOSCOPY FOR SCREENING CPT CODE: ;  Surgeon: Deshawn Ibanez III, MD;  Location: [facility] OR;  Service:     ENDOSCOPY  02/2002    ENDOSCOPY N/A 12/1/2017    Procedure: ESOPHAGOGASTRODUODENOSCOPY WITH BIOPSY CPT CODE: 01906;  Surgeon: Deshawn Ibanez III, MD;  Location: [facility] OR;  Service:     ENDOSCOPY N/A 7/12/2019    Procedure: ESOPHAGOGASTRODUODENOSCOPY WITH BIOPSY CPT CODE: 80764;  Surgeon: Néstor Logan MD;  Location: [facility] OR;  Service: Gastroenterology    KNEE SURGERY Bilateral     SUBTOTAL HYSTERECTOMY      VARICOSE VEIN SURGERY         Family History   Problem Relation Age of Onset    Coronary artery disease Mother     Coronary artery disease Father 69        fatal MI age 69    Heart attack Father     Heart defect Sister         pacemaker    Liver cancer Sister     Heart attack Brother 29        s/p CAGB    Coronary artery disease Brother         fatal MI, age 50    Heart attack Brother 39    Breast cancer Neg Hx        Social History     Socioeconomic History     Marital status:    Tobacco Use    Smoking status: Every Day     Current packs/day: 0.50     Average packs/day: 0.5 packs/day for 25.0 years (12.5 ttl pk-yrs)     Types: Cigarettes    Smokeless tobacco: Never   Vaping Use    Vaping status: Never Used   Substance and Sexual Activity    Alcohol use: No    Drug use: No    Sexual activity: Defer           Objective   Physical Exam  Vitals and nursing note reviewed.   Constitutional:       Appearance: She is well-developed.   HENT:      Head: Normocephalic.   Cardiovascular:      Rate and Rhythm: Normal rate and regular rhythm.   Pulmonary:      Effort: Pulmonary effort is normal.      Breath sounds: Normal breath sounds.   Abdominal:      General: Bowel sounds are normal.      Palpations: Abdomen is soft.      Tenderness: There is no abdominal tenderness.   Musculoskeletal:         General: Normal range of motion.      Cervical back: Neck supple.   Skin:     General: Skin is warm and dry.   Neurological:      Mental Status: She is alert and oriented to person, place, and time.   Psychiatric:         Behavior: Behavior normal.         Thought Content: Thought content normal.         Judgment: Judgment normal.         Procedures           ED Course  ED Course as of 05/28/24 0903   Sun May 26, 2024   1757 Pt  [LC]      ED Course User Index  [LC] Sowmya Tipton PA                                             Medical Decision Making  Presents via EMS from residence with c/o n/v/d that begain at approx 0600 today, pt reports bright red blood in stool approx 1 hour pta, also reports abd cramping.  Pt has PMHX of HTN, DM, GERD, Hyperlipidemia       Problems Addressed:  Gastrointestinal hemorrhage, unspecified gastrointestinal hemorrhage type: complicated acute illness or injury  Kidney stone: complicated acute illness or injury    Amount and/or Complexity of Data Reviewed  Labs: ordered.  Radiology: ordered.    Risk  OTC drugs.  Prescription drug management.  Risk  Details: Sowmya Tipton    Patient is stable.  Patient is medically cleared.  No EMC exist.  Patient is discharged home.  Patient's diagnostic results were discussed with him and they demonstrated understanding of diagnosis and treatment plan.  Patient was advised to return to the ER or follow-up with PCP if symptoms worsen or fail to improve as expected.         Final diagnoses:   Kidney stone   Gastrointestinal hemorrhage, unspecified gastrointestinal hemorrhage type       ED Disposition  ED Disposition       ED Disposition   Discharge    Condition   Stable    Comment   --               Kevin Egan MD  [address]  [address]  [place] [address]  [phone]    In 2 days  Tuesday at 10 am with Dr Jignesh Rivera, Stanley BUSH PA-C  [address] [facility]  [address]  [place] [address]  [phone]    In 2 days      Kendrick Lemus MD  [address] [facility] [address] [phone]      If symptoms worsen         Medication List        New Prescriptions      HYDROcodone-acetaminophen 5-325 MG per tablet  Commonly known as: NORCO  Take 1 tablet by mouth 4 (Four) Times a Day As Needed for Moderate Pain.     naloxone 4 MG/0.1ML nasal spray  Commonly known as: NARCAN  Call 911. Don't prime. [place] in 1 nostril for overdose. Repeat in 2-3 minutes in other nostril if no or minimal breathing/responsiveness.     Pramoxine HCl (Perianal) 1 % foam  Commonly known as: Proctofoam  Insert  into the rectum Every 2 (Two) Hours As Needed for Hemorrhoids.     tamsulosin 0.4 MG capsule 24 hr capsule  Commonly known as: FLOMAX  Take 1 capsule by mouth Daily.               Where to Get Your Medications        These medications were sent to [facility] [place], [place] - [address] [place] [address] [phone] [facility] [phone] [address] [address]      Phone: [phone]   HYDROcodone-acetaminophen 5-325 MG per tablet  naloxone 4 MG/0.1ML nasal spray  Pramoxine HCl (Perianal) 1 % foam  tamsulosin 0.4  MG capsule 24 hr capsule            Sowmya Tipton PA  05/28/24 0904

## 2024-05-26 NOTE — ED NOTES
Pt was assisted to the bathroom and a hat was placed in the toilet but pt was unable to provide a sample. It was noted that there was some blood in the toilet water.

## 2024-05-28 ENCOUNTER — OFFICE VISIT (OUTPATIENT)
Dept: UROLOGY | Facility: CLINIC | Age: 74
End: 2024-05-28
Payer: MEDICARE

## 2024-05-28 ENCOUNTER — PATIENT ROUNDING (BHMG ONLY) (OUTPATIENT)
Dept: SURGERY | Facility: CLINIC | Age: 74
End: 2024-05-28

## 2024-05-28 ENCOUNTER — OFFICE VISIT (OUTPATIENT)
Dept: SURGERY | Facility: CLINIC | Age: 74
End: 2024-05-28
Payer: MEDICARE

## 2024-05-28 VITALS
DIASTOLIC BLOOD PRESSURE: 72 MMHG | WEIGHT: 149.5 LBS | HEIGHT: 62 IN | SYSTOLIC BLOOD PRESSURE: 118 MMHG | HEART RATE: 96 BPM | BODY MASS INDEX: 27.51 KG/M2

## 2024-05-28 VITALS
WEIGHT: 149 LBS | HEIGHT: 62 IN | SYSTOLIC BLOOD PRESSURE: 101 MMHG | DIASTOLIC BLOOD PRESSURE: 84 MMHG | BODY MASS INDEX: 27.42 KG/M2

## 2024-05-28 DIAGNOSIS — N28.1 RENAL CYST, LEFT: ICD-10-CM

## 2024-05-28 DIAGNOSIS — R33.9 INCOMPLETE EMPTYING OF BLADDER: ICD-10-CM

## 2024-05-28 DIAGNOSIS — N13.30 BILATERAL HYDRONEPHROSIS: ICD-10-CM

## 2024-05-28 DIAGNOSIS — N20.1 LEFT URETERAL STONE: Primary | ICD-10-CM

## 2024-05-28 DIAGNOSIS — Z86.010 HISTORY OF COLON POLYPS: Primary | ICD-10-CM

## 2024-05-28 DIAGNOSIS — K52.9 COLITIS: ICD-10-CM

## 2024-05-28 PROCEDURE — 3074F SYST BP LT 130 MM HG: CPT

## 2024-05-28 PROCEDURE — 99214 OFFICE O/P EST MOD 30 MIN: CPT

## 2024-05-28 PROCEDURE — 3078F DIAST BP <80 MM HG: CPT

## 2024-05-28 PROCEDURE — 1160F RVW MEDS BY RX/DR IN RCRD: CPT

## 2024-05-28 PROCEDURE — 1159F MED LIST DOCD IN RCRD: CPT

## 2024-05-28 RX ORDER — EMPAGLIFLOZIN 25 MG/1
25 TABLET, FILM COATED ORAL EVERY MORNING
COMMUNITY
Start: 2024-05-13

## 2024-05-28 RX ORDER — AMOXICILLIN AND CLAVULANATE POTASSIUM 875; 125 MG/1; MG/1
1 TABLET, FILM COATED ORAL 2 TIMES DAILY
Qty: 20 TABLET | Refills: 0 | Status: SHIPPED | OUTPATIENT
Start: 2024-05-28 | End: 2024-06-07

## 2024-05-28 NOTE — PROGRESS NOTES
"Chief Complaint:    Chief Complaint   Patient presents with    Nephrolithiasis       Vital Signs:   /72 (BP Location: Left arm, Patient Position: Sitting, Cuff Size: Adult)   Pulse 96   Ht 157.5 cm (62.01\")   Wt 67.8 kg (149 lb 8 oz)   BMI 27.34 kg/m²   Body mass index is 27.34 kg/m².      HPI:  Valerie Lopez is a 74 y.o. female who presents today for  emergency department follow-up    History of Present Illness  Ms. lopez is a pleasant 74-year-old female who has a past medical history significant for arthritis, type 2 diabetes mellitus, hyperlipidemia, GERD, hypertension, and colitis.  It is to note that the patient has been seen previously in office by Dr. Dunlap for gross hematuria/bladder mass found on ultrasound.  She was scheduled undergo a cystoscopy urogram however never kept appointments.  She was recently seen in the emergency department on 5/26/2024 due to nausea, vomiting, diarrhea, and rectal bleeding.  Symptoms that started at roughly 6 AM that morning.  She reports that stools were dark black in color at first but she began to notice bright red blood in stools.  She reports vomiting was bilious in color with no bright red or black tinge.  She had a gastrointestinal panel as well as a C. difficile panel completed at that time that was unremarkable.  She does have a history of colon polyps.  She had a CBC completed at time of ER visit that showed an elevated white blood cell count of 20,000.  She had a normal GFR of 81 and creatinine 1.76.  Urine analysis completed at time of ER visit showed 3+ glucose and 1+ ketones only.  No signs of microscopic or gross hematuria.  She had a CT scan of the abdomen pelvis that showed significant mucosal thickening involving the sigmoid and descending colon most consistent with colitis.  She also had mild bilateral hydronephrosis with a probable distal left 1 mm ureteral calculus.  No abnormalities of the bladder was identified.  States that since ER " visit she continues to have notable diarrhea with pink tinge blood when wiping.  She did follow-up with Dr. Egan today who has started her on Augmentin twice daily.  She has been on Flomax stable with passage of kidney stones.  She does endorse some slight frequency and urgency but denies any dysuria, difficulty urinating, gross hematuria, or worsening left-sided back or flank pain.  States nausea and vomiting has improved as well.  She endorses a history of constipation for which she takes Linzess daily as well as MiraLAX as needed multiple times a week.  She was able to urinate in office today and postvoid residual 0 mL      Past Medical History:  Past Medical History:   Diagnosis Date    Arthritis     DMII (diabetes mellitus, type 2)     Dyslipidemia     Elevated cholesterol     GERD (gastroesophageal reflux disease)     HTN (hypertension)     Hyperlipidemia     Type 1 diabetes mellitus 12/20/2017    Type 2 diabetes mellitus with complication 12/20/2017       Current Meds:  Current Outpatient Medications   Medication Sig Dispense Refill    ALPRAZolam (XANAX) 0.25 MG tablet Take 1 tablet by mouth 2 (Two) Times a Day As Needed for Anxiety.      amoxicillin-clavulanate (AUGMENTIN) 875-125 MG per tablet Take 1 tablet by mouth 2 (Two) Times a Day for 10 days. 20 tablet 0    atorvastatin (LIPITOR) 40 MG tablet Take 1 tablet by mouth Daily.      glimepiride (AMARYL) 4 MG tablet Take 1 tablet by mouth Every Morning Before Breakfast.      HYDROcodone-acetaminophen (NORCO) 5-325 MG per tablet Take 1 tablet by mouth 4 (Four) Times a Day As Needed for Moderate Pain. 12 tablet 0    Jardiance 25 MG tablet tablet Take 1 tablet by mouth Every Morning.      linaclotide (Linzess) 145 MCG capsule capsule Take 1 capsule by mouth Every Morning Before Breakfast.      Loratadine (Claritin) 10 MG capsule Take  by mouth.      losartan (COZAAR) 100 MG tablet Take 1 tablet by mouth Every Night. Takes 1 & 1/2 of 50mg 30 tablet 2     naloxone (NARCAN) 4 MG/0.1ML nasal spray Call 911. Don't prime. Loa in 1 nostril for overdose. Repeat in 2-3 minutes in other nostril if no or minimal breathing/responsiveness. 2 each 0    Pramoxine HCl, Perianal, (Proctofoam) 1 % foam Insert  into the rectum Every 2 (Two) Hours As Needed for Hemorrhoids. 15 g 0    QUEtiapine (SEROquel) 200 MG tablet Take 1 tablet by mouth Every Night.      Semaglutide,0.25 or 0.5MG/DOS, (Ozempic, 0.25 or 0.5 MG/DOSE,) 2 MG/1.5ML solution pen-injector Inject  under the skin into the appropriate area as directed 1 (One) Time Per Week.      tamsulosin (FLOMAX) 0.4 MG capsule 24 hr capsule Take 1 capsule by mouth Daily. 30 capsule 0    traMADol (ULTRAM) 50 MG tablet Take 1 tablet by mouth 2 (Two) Times a Day.       No current facility-administered medications for this visit.        Allergies:   No Known Allergies     Past Surgical History:  Past Surgical History:   Procedure Laterality Date    BLADDER SUSPENSION      CATARACT EXTRACTION W/ INTRAOCULAR LENS  IMPLANT, BILATERAL      CHOLECYSTECTOMY  11/04/2015    laparoscopic    COLONOSCOPY  03/30/2009    COLONOSCOPY N/A 12/1/2017    Procedure: COLONOSCOPY FOR SCREENING CPT CODE: ;  Surgeon: Deshawn Ibanez III, MD;  Location: [facility];  Service:     ENDOSCOPY  02/2002    ENDOSCOPY N/A 12/1/2017    Procedure: ESOPHAGOGASTRODUODENOSCOPY WITH BIOPSY CPT CODE: 02475;  Surgeon: Deshawn Ibanez III, MD;  Location: [facility] OR;  Service:     ENDOSCOPY N/A 7/12/2019    Procedure: ESOPHAGOGASTRODUODENOSCOPY WITH BIOPSY CPT CODE: 03693;  Surgeon: Néstor Logan MD;  Location: [facility];  Service: Gastroenterology    KNEE SURGERY Bilateral     SUBTOTAL HYSTERECTOMY      VARICOSE VEIN SURGERY         Social History:  Social History     Socioeconomic History    Marital status:    Tobacco Use    Smoking status: Every Day     Current packs/day: 0.50     Average packs/day: 0.5 packs/day for 25.0 years (12.5 ttl pk-yrs)     Types:  Cigarettes    Smokeless tobacco: Never   Vaping Use    Vaping status: Never Used   Substance and Sexual Activity    Alcohol use: No    Drug use: No    Sexual activity: Defer       Family History:  Family History   Problem Relation Age of Onset    Coronary artery disease Mother     Coronary artery disease Father 69        fatal MI age 69    Heart attack Father     Heart defect Sister         pacemaker    Liver cancer Sister     Heart attack Brother 29        s/p CAGB    Coronary artery disease Brother         fatal MI, age 50    Heart attack Brother 39    Breast cancer Neg Hx        Review of Systems:  Review of Systems   Constitutional:  Positive for fatigue.   Gastrointestinal:  Positive for blood in stool, constipation, diarrhea, nausea and vomiting.   Genitourinary:  Positive for flank pain, pelvic pain and vaginal pain. Negative for decreased urine volume, difficulty urinating, dyspareunia, enuresis, frequency, genital sores, hematuria, menstrual problem, urgency, vaginal bleeding and vaginal discharge.   Musculoskeletal:  Positive for back pain.       Physical Exam:  Physical Exam  Constitutional:       General: She is not in acute distress.     Appearance: Normal appearance.   HENT:      Head: Normocephalic and atraumatic.      Nose: Nose normal.      Mouth/Throat:      Mouth: Mucous membranes are moist.      Pharynx: Oropharynx is clear.   Eyes:      Extraocular Movements: Extraocular movements intact.      Conjunctiva/sclera: Conjunctivae normal.   Cardiovascular:      Rate and Rhythm: Normal rate and regular rhythm.      Pulses: Normal pulses.      Heart sounds: Normal heart sounds.   Pulmonary:      Effort: Pulmonary effort is normal.      Breath sounds: Normal breath sounds.   Abdominal:      General: Abdomen is flat. Bowel sounds are normal.      Palpations: Abdomen is soft.   Musculoskeletal:         General: Normal range of motion.      Cervical back: Normal range of motion.   Skin:     General: Skin  is warm.   Neurological:      General: No focal deficit present.      Mental Status: She is alert and oriented to person, place, and time.   Psychiatric:         Mood and Affect: Mood normal.         Behavior: Behavior normal.         Thought Content: Thought content normal.         Judgment: Judgment normal.             Recent Image (CT and/or KUB):   CT Abdomen and Pelvis: Results for orders placed during the hospital encounter of 11/08/21    CT Abdomen Pelvis With & Without Contrast    Narrative  EXAM:  CT Abdomen and Pelvis Without and With Intravenous Contrast    EXAM DATE:  11/8/2021 8:10 AM    CLINICAL HISTORY:  Bladder tumor; R31.0-Gross hematuria    TECHNIQUE:  Axial computed tomography images of the abdomen and pelvis without and  with intravenous contrast.  Sagittal and coronal reformatted images were  created and reviewed.  This CT exam was performed using one or more of  the following dose reduction techniques:  automated exposure control,  adjustment of the mA and/or kV according to patient size, and/or use of  iterative reconstruction technique.    COMPARISON:  06/27/2019    FINDINGS:  LUNG BASES:  Unremarkable.  No mass.  No consolidation.    ABDOMEN:  LIVER:  Unremarkable.  No mass.  GALLBLADDER AND BILE DUCTS:  The gallbladder has been removed.  No  ductal dilation.  PANCREAS:  Unremarkable.  No mass.  No ductal dilation.  SPLEEN:  Unremarkable.  No splenomegaly.  ADRENALS:  Unremarkable.  No mass.  KIDNEYS AND URETERS:  Unremarkable.  No solid mass.  No obstructing  stones.  No hydronephrosis.  STOMACH AND BOWEL:  Unremarkable.  No obstruction.  No mucosal  thickening.    PELVIS:  APPENDIX:  No findings to suggest acute appendicitis.  BLADDER:  Unremarkable.  No mass.  No stones.  REPRODUCTIVE:  Unremarkable as visualized.    ABDOMEN and PELVIS:  INTRAPERITONEAL SPACE:  Unremarkable.  No free air.  No significant  fluid collection.  BONES/JOINTS:  No acute fracture.  No dislocation.  SOFT TISSUES:   Unremarkable.  VASCULATURE:  Vascular calcifications are noted.  No abdominal aortic  aneurysm.  LYMPH NODES:  Unremarkable.  No enlarged lymph nodes.    Impression  No acute findings in the abdomen or pelvis.    This report was finalized on 11/8/2021 8:49 AM by Dr. Jose R Galan MD.     CT Stone Protocol: No results found for this or any previous visit.     KUB: No results found for this or any previous visit.       Labs:  Brief Urine Lab Results  (Last result in the past 365 days)        Color   Clarity   Blood   Leuk Est   Nitrite   Protein   CREAT   Urine HCG        05/26/24 1543 Yellow   Clear   Negative   Negative   Negative   Negative                 Admission on 05/26/2024, Discharged on 05/26/2024   Component Date Value Ref Range Status    Glucose 05/26/2024 153 (H)  65 - 99 mg/dL Final    BUN 05/26/2024 18  8 - 23 mg/dL Final    Creatinine 05/26/2024 0.76  0.57 - 1.00 mg/dL Final    Sodium 05/26/2024 140  136 - 145 mmol/L Final    Potassium 05/26/2024 4.0  3.5 - 5.2 mmol/L Final    Chloride 05/26/2024 106  98 - 107 mmol/L Final    CO2 05/26/2024 23.8  22.0 - 29.0 mmol/L Final    Calcium 05/26/2024 9.6  8.6 - 10.5 mg/dL Final    Total Protein 05/26/2024 7.1  6.0 - 8.5 g/dL Final    Albumin 05/26/2024 4.4  3.5 - 5.2 g/dL Final    ALT (SGPT) 05/26/2024 40 (H)  1 - 33 U/L Final    AST (SGOT) 05/26/2024 31  1 - 32 U/L Final    Alkaline Phosphatase 05/26/2024 71  39 - 117 U/L Final    Total Bilirubin 05/26/2024 0.4  0.0 - 1.2 mg/dL Final    Globulin 05/26/2024 2.7  gm/dL Final    A/G Ratio 05/26/2024 1.6  g/dL Final    BUN/Creatinine Ratio 05/26/2024 23.7  7.0 - 25.0 Final    Anion Gap 05/26/2024 10.2  5.0 - 15.0 mmol/L Final    eGFR 05/26/2024 81.8  >60.0 mL/min/1.73 Final    Color, UA 05/26/2024 Yellow  Yellow, Straw Final    Appearance, UA 05/26/2024 Clear  Clear Final    pH, UA 05/26/2024 7.5  5.0 - 8.0 Final    Specific Gravity, UA 05/26/2024 1.025  1.005 - 1.030 Final    Glucose, UA 05/26/2024 >=1000  mg/dL (3+) (A)  Negative Final    Ketones, UA 05/26/2024 15 mg/dL (1+) (A)  Negative Final    Bilirubin, UA 05/26/2024 Negative  Negative Final    Blood, UA 05/26/2024 Negative  Negative Final    Protein, UA 05/26/2024 Negative  Negative Final    Leuk Esterase, UA 05/26/2024 Negative  Negative Final    Nitrite, UA 05/26/2024 Negative  Negative Final    Urobilinogen, UA 05/26/2024 0.2 E.U./dL  0.2 - 1.0 E.U./dL Final    WBC 05/26/2024 12.67 (H)  3.40 - 10.80 10*3/mm3 Final    RBC 05/26/2024 4.85  3.77 - 5.28 10*6/mm3 Final    Hemoglobin 05/26/2024 15.1  12.0 - 15.9 g/dL Final    Hematocrit 05/26/2024 43.8  34.0 - 46.6 % Final    MCV 05/26/2024 90.3  79.0 - 97.0 fL Final    MCH 05/26/2024 31.1  26.6 - 33.0 pg Final    MCHC 05/26/2024 34.5  31.5 - 35.7 g/dL Final    RDW 05/26/2024 12.0 (L)  12.3 - 15.4 % Final    RDW-SD 05/26/2024 39.9  37.0 - 54.0 fl Final    MPV 05/26/2024 9.3  6.0 - 12.0 fL Final    Platelets 05/26/2024 228  140 - 450 10*3/mm3 Final    Neutrophil % 05/26/2024 83.4 (H)  42.7 - 76.0 % Final    Lymphocyte % 05/26/2024 9.2 (L)  19.6 - 45.3 % Final    Monocyte % 05/26/2024 6.5  5.0 - 12.0 % Final    Eosinophil % 05/26/2024 0.2 (L)  0.3 - 6.2 % Final    Basophil % 05/26/2024 0.3  0.0 - 1.5 % Final    Immature Grans % 05/26/2024 0.4  0.0 - 0.5 % Final    Neutrophils, Absolute 05/26/2024 10.57 (H)  1.70 - 7.00 10*3/mm3 Final    Lymphocytes, Absolute 05/26/2024 1.17  0.70 - 3.10 10*3/mm3 Final    Monocytes, Absolute 05/26/2024 0.82  0.10 - 0.90 10*3/mm3 Final    Eosinophils, Absolute 05/26/2024 0.02  0.00 - 0.40 10*3/mm3 Final    Basophils, Absolute 05/26/2024 0.04  0.00 - 0.20 10*3/mm3 Final    Immature Grans, Absolute 05/26/2024 0.05  0.00 - 0.05 10*3/mm3 Final    nRBC 05/26/2024 0.0  0.0 - 0.2 /100 WBC Final    Extra Tube 05/26/2024 Hold for add-ons.   Final    Auto resulted.    Extra Tube 05/26/2024 hold for add-on   Final    Auto resulted    Extra Tube 05/26/2024 Hold for add-ons.   Final    Auto  resulted.    Extra Tube 05/26/2024 Hold for add-ons.   Final    Auto resulted    Campylobacter 05/26/2024 Not Detected  Not Detected Final    Plesiomonas shigelloides 05/26/2024 Not Detected  Not Detected Final    Salmonella 05/26/2024 Not Detected  Not Detected Final    Vibrio 05/26/2024 Not Detected  Not Detected Final    Vibrio cholerae 05/26/2024 Not Detected  Not Detected Final    Yersinia enterocolitica 05/26/2024 Not Detected  Not Detected Final    Enteroaggregative E. coli (EAEC) 05/26/2024 Not Detected  Not Detected Final    Enteropathogenic E. coli (EPEC) 05/26/2024 Not Detected  Not Detected Final    Enterotoxigenic E. coli (ETEC) lt/* 05/26/2024 Not Detected  Not Detected Final    Shiga-like toxin-producing E. coli* 05/26/2024 Not Detected  Not Detected Final    Shigella/Enteroinvasive E. coli (E* 05/26/2024 Not Detected  Not Detected Final    Cryptosporidium 05/26/2024 Not Detected  Not Detected Final    Cyclospora cayetanensis 05/26/2024 Not Detected  Not Detected Final    Entamoeba histolytica 05/26/2024 Not Detected  Not Detected Final    Giardia lamblia 05/26/2024 Not Detected  Not Detected Final    Adenovirus F40/41 05/26/2024 Not Detected  Not Detected Final    Astrovirus 05/26/2024 Not Detected  Not Detected Final    Norovirus GI/GII 05/26/2024 Not Detected  Not Detected Final    Rotavirus A 05/26/2024 Not Detected  Not Detected Final    Sapovirus (I, II, IV or V) 05/26/2024 Not Detected  Not Detected Final    Toxigenic C. difficile by PCR 05/26/2024 Negative  Negative Final    027 Toxin 05/26/2024 Presumptive Negative   Final        Procedure: None  Procedures     I have reviewed and agree with the above PMH, PSH, FMH, social history, medications, allergies, and labs.     Assessment/Plan:   Problem List Items Addressed This Visit       Renal cyst, left     Other Visit Diagnoses       Left ureteral stone    -  Primary    Bilateral hydronephrosis        Incomplete emptying of bladder         Relevant Orders    Bladder Scan (Completed)    Colitis                Health Maintenance:   Health Maintenance Due   Topic Date Due    LIPID PANEL  Never done    URINE MICROALBUMIN  Never done    DXA SCAN  Never done    Pneumococcal Vaccine 65+ (1 of 2 - PCV) Never done    DIABETIC EYE EXAM  Never done    ZOSTER VACCINE (1 of 2) Never done    RSV Vaccine - Adults (1 - 1-dose 60+ series) Never done    HEPATITIS C SCREENING  Never done    ANNUAL WELLNESS VISIT  Never done    DIABETIC FOOT EXAM  Never done    HEMOGLOBIN A1C  Never done    TDAP/TD VACCINES (2 - Td or Tdap) 08/21/2022    COVID-19 Vaccine (3 - 2023-24 season) 09/01/2023        Smoking Counseling: Everyday smoker.  Never used smokeless tobacco.  Counseling given however not ready to quit at this time    Urine Incontinence: Patient reports that she is not currently experiencing any symptoms of urinary incontinence.    Patient was given instructions and counseling regarding her condition or for health maintenance advice. Please see specific information pulled into the AVS if appropriate.    Patient Education:   Left ureteral stone -discussed with the patient the pathophysiology of nephrolithiasis.  I discussed probability of passing stone voluntarily less than 5 mm in size including roughly 80%.  Advised her that she most likely passed the stone spontaneously that is less than 2 mm in size.  Given patient's minimal lower urinary tract symptoms and no concerns of severe obstruction on CT will continue with observation and Flomax 0.4 mg daily.  Advised patient to increase water intake to roughly 2 L/day.  Did advise patient if symptoms worsen return to the clinic sooner if needed.  Also discussed the use of a left uteroscopy with holmium laser lithotripsy and stone extraction.  Discussed the risk and benefits of this procedure as well.  Patient verbalized understanding.  Colitis/bloody stools - discussed with the patient the pathophysiology of this condition  in detail as well.  Patient has followed up with general surgeon and is currently been prescribed Augmentin.  Advised her to take medications as prescribed.  I did educate patient to start a daily probiotic to with growth control of normal digestive dwayne.  Also discussed the use of a bland diet.  Patient verbalized understanding and agreed to plan of care.  Bilateral hydronephrosis -discussed with the patient the pathophysiology of this condition in detail.  I discussed causes which can include but not limited to vesicular reflux, obstructive uropathy, nephrolithiasis, bladder tumor, bladder stones, retention, or other urological abnormalities.  Given patient's hydronephrosis was mild and recommend to continue with observation at this time.  Did discuss the use of a cystoscopy with retrograde pyelogram when indicated.  She verbalized understanding.  Left renal cysts -discussed with the patient the pathophysiology of renal cyst.  Discussed with patient classifications of renal cyst including a Bosniak type I-IV.  Advising type I and II was low risk for renal cell carcinoma at less than 10 to 15% respectively.  Advised patient that a type III and IV are high risk for renal cell carcinoma at roughly 50 to 100%.  Patient's Bosniak type I cyst is benign in nature and I recommend to continue with observation.  No further workup is needed.  Otherwise we will place the patient back in roughly 2 weeks for reevaluation of left ureteral stone.  She verbalized understanding    Visit Diagnoses:    ICD-10-CM ICD-9-CM   1. Left ureteral stone  N20.1 592.1   2. Bilateral hydronephrosis  N13.30 591   3. Incomplete emptying of bladder  R33.9 788.21   4. Renal cyst, left  N28.1 753.10   5. Colitis  K52.9 558.9       Meds Ordered During Visit:  No orders of the defined types were placed in this encounter.      Follow Up Appointment: 1 week  No follow-ups on file.      This document has been electronically signed by Stanley Rivera  CAHNTELLE   May 28, 2024 11:37 EDT    Part of this note may be an electronic transcription/translation of spoken language to printed text using the Dragon Dictation System.

## 2024-05-28 NOTE — PROGRESS NOTES
May 28, 2024    Hello, may I speak with Valerie Stroud?    My name is Dayna      I am  with [facility]  [facility]  [address], [place] [address]  [place] [address] [phone].    Before we get started may I verify your date of birth? 1950    I am calling to officially welcome you to our practice and ask about your recent visit. Is this a good time to talk? yes    Tell me about your visit with us. What things went well?  Everyone here is so nice       We're always looking for ways to make our patients' experiences even better. Do you have recommendations on ways we may improve?  no    Overall were you satisfied with your first visit to our practice? yes       I appreciate you taking the time to speak with me today. Is there anything else I can do for you? no      Thank you, and have a great day.

## 2024-05-29 NOTE — PROGRESS NOTES
Subjective   Valerie Stroud is a 74 y.o. female is being seen for consultation today at the request of Kendrick Lemus MD    Valerie Stroud is a 74 y.o. female History of Present Illness  With recent presentation to the emergency department with blood per rectum.  She states she had drops in the bowl and when wiping continue to have bright red blood.  No noted hemorrhoids.  CT at the emergency department shows possible colitis of uncertain etiology.  No abdominal pain reported.  No fever or systemic symptoms.  She is several years out from her last colonoscopy.  At that time she was noted to have polyps.  Rectal Bleeding  Pertinent negatives include no abdominal pain, chest pain, chills, coughing, fever, headaches, joint swelling, nausea, rash, sore throat, vomiting or weakness.       Past Medical History:   Diagnosis Date    Arthritis     DMII (diabetes mellitus, type 2)     Dyslipidemia     Elevated cholesterol     GERD (gastroesophageal reflux disease)     HTN (hypertension)     Hyperlipidemia     Type 1 diabetes mellitus 12/20/2017    Type 2 diabetes mellitus with complication 12/20/2017       Family History   Problem Relation Age of Onset    Coronary artery disease Mother     Coronary artery disease Father 69        fatal MI age 69    Heart attack Father     Heart defect Sister         pacemaker    Liver cancer Sister     Heart attack Brother 29        s/p CAGB    Coronary artery disease Brother         fatal MI, age 50    Heart attack Brother 39    Breast cancer Neg Hx        Social History     Socioeconomic History    Marital status:    Tobacco Use    Smoking status: Every Day     Current packs/day: 0.50     Average packs/day: 0.5 packs/day for 25.0 years (12.5 ttl pk-yrs)     Types: Cigarettes    Smokeless tobacco: Never   Vaping Use    Vaping status: Never Used   Substance and Sexual Activity    Alcohol use: No    Drug use: No    Sexual activity: Defer       Past Surgical History:   Procedure  "Laterality Date    BLADDER SUSPENSION      CATARACT EXTRACTION W/ INTRAOCULAR LENS  IMPLANT, BILATERAL      CHOLECYSTECTOMY  11/04/2015    laparoscopic    COLONOSCOPY  03/30/2009    COLONOSCOPY N/A 12/1/2017    Procedure: COLONOSCOPY FOR SCREENING CPT CODE: ;  Surgeon: Deshawn Ibanez III, MD;  Location: [facility];  Service:     ENDOSCOPY  02/2002    ENDOSCOPY N/A 12/1/2017    Procedure: ESOPHAGOGASTRODUODENOSCOPY WITH BIOPSY CPT CODE: 91047;  Surgeon: Deshawn Ibanez III, MD;  Location: [facility];  Service:     ENDOSCOPY N/A 7/12/2019    Procedure: ESOPHAGOGASTRODUODENOSCOPY WITH BIOPSY CPT CODE: 44270;  Surgeon: Néstor Logan MD;  Location: [facility];  Service: Gastroenterology    KNEE SURGERY Bilateral     SUBTOTAL HYSTERECTOMY      VARICOSE VEIN SURGERY         Review of Systems   Constitutional:  Negative for activity change, appetite change, chills and fever.   HENT:  Negative for sore throat and trouble swallowing.    Eyes:  Negative for visual disturbance.   Respiratory:  Negative for cough and shortness of breath.    Cardiovascular:  Negative for chest pain and palpitations.   Gastrointestinal:  Positive for hematochezia. Negative for abdominal distention, abdominal pain, blood in stool, constipation, diarrhea, nausea and vomiting.   Endocrine: Negative for cold intolerance and heat intolerance.   Genitourinary:  Negative for dysuria.   Musculoskeletal:  Negative for joint swelling.   Skin:  Negative for color change, rash and wound.   Allergic/Immunologic: Negative for immunocompromised state.   Neurological:  Negative for dizziness, seizures, weakness and headaches.   Hematological:  Negative for adenopathy. Does not bruise/bleed easily.   Psychiatric/Behavioral:  Negative for agitation and confusion.          /84   Ht 157.5 cm (62\")   Wt 67.6 kg (149 lb)   BMI 27.25 kg/m²   Objective   Physical Exam  Constitutional:       Appearance: She is well-developed.   HENT:      Head: " Normocephalic and atraumatic.   Eyes:      Conjunctiva/sclera: Conjunctivae normal.      Pupils: Pupils are equal, round, and reactive to light.   Neck:      Thyroid: No thyromegaly.      Vascular: No JVD.      Trachea: No tracheal deviation.   Cardiovascular:      Rate and Rhythm: Normal rate and regular rhythm.      Heart sounds: No murmur heard.     No friction rub. No gallop.   Pulmonary:      Effort: Pulmonary effort is normal.      Breath sounds: Normal breath sounds.   Abdominal:      General: There is no distension.      Palpations: Abdomen is soft. There is no hepatomegaly or splenomegaly.      Tenderness: There is no abdominal tenderness.      Hernia: No hernia is present.   Musculoskeletal:         General: No deformity. Normal range of motion.      Cervical back: Neck supple.   Skin:     General: Skin is warm and dry.   Neurological:      Mental Status: She is alert and oriented to person, place, and time.               Assessment   Diagnoses and all orders for this visit:    1. History of colon polyps (Primary)    Other orders  -     amoxicillin-clavulanate (AUGMENTIN) 875-125 MG per tablet; Take 1 tablet by mouth 2 (Two) Times a Day for 10 days.  Dispense: 20 tablet; Refill: 0      Valerie Stroud is a 74 y.o. female with bright red blood per rectum either from hemorrhoids or more likely colitis based on imaging findings.  Colitis is of uncertain etiology and she will be placed on oral antibiotics and follow-up in 2 weeks.  Following an antibiotic course we will plan for colonoscopy.

## 2024-05-30 ENCOUNTER — PATIENT ROUNDING (BHMG ONLY) (OUTPATIENT)
Dept: UROLOGY | Facility: CLINIC | Age: 74
End: 2024-05-30
Payer: MEDICARE

## 2024-06-11 ENCOUNTER — TELEPHONE (OUTPATIENT)
Dept: SURGERY | Facility: CLINIC | Age: 74
End: 2024-06-11

## 2024-06-11 NOTE — TELEPHONE ENCOUNTER
Caller: Valerie Stroud    Relationship to patient: Self    Best call back number: [phone]    Patient is needing: PATIENT CALLED AND CX SAME DAY APPT DUE TO NO ONE AVAILABLE TO BRING HER. STATED SHE WOULD CALL BACK TO RESCHEDULE IF SHE DECIDED TO COME ANOTHER DAY.

## 2024-06-12 ENCOUNTER — OFFICE VISIT (OUTPATIENT)
Dept: SURGERY | Facility: CLINIC | Age: 74
End: 2024-06-12
Payer: MEDICARE

## 2024-06-12 ENCOUNTER — OFFICE VISIT (OUTPATIENT)
Dept: UROLOGY | Facility: CLINIC | Age: 74
End: 2024-06-12
Payer: MEDICARE

## 2024-06-12 VITALS
BODY MASS INDEX: 27.57 KG/M2 | WEIGHT: 149.8 LBS | SYSTOLIC BLOOD PRESSURE: 128 MMHG | DIASTOLIC BLOOD PRESSURE: 75 MMHG | HEIGHT: 62 IN | HEART RATE: 88 BPM

## 2024-06-12 VITALS — HEIGHT: 62 IN | WEIGHT: 149 LBS | BODY MASS INDEX: 27.42 KG/M2

## 2024-06-12 DIAGNOSIS — K52.9 COLITIS: Primary | ICD-10-CM

## 2024-06-12 DIAGNOSIS — N20.1 LEFT URETERAL STONE: Primary | ICD-10-CM

## 2024-06-12 LAB
BILIRUB BLD-MCNC: NEGATIVE MG/DL
CLARITY, POC: CLEAR
COLOR UR: YELLOW
EXPIRATION DATE: ABNORMAL
GLUCOSE UR STRIP-MCNC: ABNORMAL MG/DL
KETONES UR QL: NEGATIVE
LEUKOCYTE EST, POC: NEGATIVE
Lab: ABNORMAL
NITRITE UR-MCNC: NEGATIVE MG/ML
PH UR: 6.5 [PH] (ref 5–8)
PROT UR STRIP-MCNC: NEGATIVE MG/DL
RBC # UR STRIP: NEGATIVE /UL
SP GR UR: 1.01 (ref 1–1.03)
UROBILINOGEN UR QL: NORMAL

## 2024-06-12 PROCEDURE — 99213 OFFICE O/P EST LOW 20 MIN: CPT | Performed by: SURGERY

## 2024-06-12 PROCEDURE — 1159F MED LIST DOCD IN RCRD: CPT | Performed by: SURGERY

## 2024-06-12 PROCEDURE — 1160F RVW MEDS BY RX/DR IN RCRD: CPT | Performed by: SURGERY

## 2024-06-12 RX ORDER — SODIUM, POTASSIUM,MAG SULFATES 17.5-3.13G
1 SOLUTION, RECONSTITUTED, ORAL ORAL EVERY 12 HOURS
Qty: 354 ML | Refills: 0 | Status: SHIPPED | OUTPATIENT
Start: 2024-06-12

## 2024-06-12 NOTE — PROGRESS NOTES
Subjective   Valerie Stroud is a 74 y.o. female is being seen for consultation today at the request of Kendrick Lemus MD    Valerie Stroud is a 74 y.o. female History of Present Illness  With recent presentation to the emergency department with blood per rectum.  She states she had drops in the bowl and when wiping continue to have bright red blood.  No noted hemorrhoids.  CT at the emergency department shows possible colitis of uncertain etiology.  No abdominal pain reported.  No fever or systemic symptoms.  She is several years out from her last colonoscopy.  At that time she was noted to have polyps.  Rectal Bleeding  Pertinent negatives include no abdominal pain, chest pain, chills, coughing, fever, headaches, joint swelling, nausea, rash, sore throat, vomiting or weakness.       Past Medical History:   Diagnosis Date    Arthritis     DMII (diabetes mellitus, type 2)     Dyslipidemia     Elevated cholesterol     GERD (gastroesophageal reflux disease)     HTN (hypertension)     Hyperlipidemia     Type 1 diabetes mellitus 12/20/2017    Type 2 diabetes mellitus with complication 12/20/2017       Family History   Problem Relation Age of Onset    Coronary artery disease Mother     Coronary artery disease Father 69        fatal MI age 69    Heart attack Father     Heart defect Sister         pacemaker    Liver cancer Sister     Heart attack Brother 29        s/p CAGB    Coronary artery disease Brother         fatal MI, age 50    Heart attack Brother 39    Breast cancer Neg Hx        Social History     Socioeconomic History    Marital status:    Tobacco Use    Smoking status: Every Day     Current packs/day: 0.50     Average packs/day: 0.5 packs/day for 25.0 years (12.5 ttl pk-yrs)     Types: Cigarettes    Smokeless tobacco: Never   Vaping Use    Vaping status: Never Used   Substance and Sexual Activity    Alcohol use: No    Drug use: No    Sexual activity: Defer       Past Surgical History:   Procedure  "Laterality Date    BLADDER SUSPENSION      CATARACT EXTRACTION W/ INTRAOCULAR LENS  IMPLANT, BILATERAL      CHOLECYSTECTOMY  11/04/2015    laparoscopic    COLONOSCOPY  03/30/2009    COLONOSCOPY N/A 12/1/2017    Procedure: COLONOSCOPY FOR SCREENING CPT CODE: ;  Surgeon: Deshawn Ibanez III, MD;  Location: [facility];  Service:     ENDOSCOPY  02/2002    ENDOSCOPY N/A 12/1/2017    Procedure: ESOPHAGOGASTRODUODENOSCOPY WITH BIOPSY CPT CODE: 16992;  Surgeon: Deshawn Ibanez III, MD;  Location: [facility] OR;  Service:     ENDOSCOPY N/A 7/12/2019    Procedure: ESOPHAGOGASTRODUODENOSCOPY WITH BIOPSY CPT CODE: 05623;  Surgeon: Néstor Logan MD;  Location: [facility] OR;  Service: Gastroenterology    KNEE SURGERY Bilateral     SUBTOTAL HYSTERECTOMY      VARICOSE VEIN SURGERY         Review of Systems   Constitutional:  Negative for activity change, appetite change, chills and fever.   HENT:  Negative for sore throat and trouble swallowing.    Eyes:  Negative for visual disturbance.   Respiratory:  Negative for cough and shortness of breath.    Cardiovascular:  Negative for chest pain and palpitations.   Gastrointestinal:  Negative for abdominal distention, abdominal pain, blood in stool, constipation, diarrhea, nausea and vomiting.   Endocrine: Negative for cold intolerance and heat intolerance.   Genitourinary:  Negative for dysuria.   Musculoskeletal:  Negative for joint swelling.   Skin:  Negative for color change, rash and wound.   Allergic/Immunologic: Negative for immunocompromised state.   Neurological:  Negative for dizziness, seizures, weakness and headaches.   Hematological:  Negative for adenopathy. Does not bruise/bleed easily.   Psychiatric/Behavioral:  Negative for agitation and confusion.          Ht 157.5 cm (62\")   Wt 67.6 kg (149 lb)   BMI 27.25 kg/m²   Objective   Physical Exam  Constitutional:       Appearance: She is well-developed.   HENT:      Head: Normocephalic and atraumatic.   Eyes:      " Conjunctiva/sclera: Conjunctivae normal.      Pupils: Pupils are equal, round, and reactive to light.   Neck:      Thyroid: No thyromegaly.      Vascular: No JVD.      Trachea: No tracheal deviation.   Cardiovascular:      Rate and Rhythm: Normal rate and regular rhythm.      Heart sounds: No murmur heard.     No friction rub. No gallop.   Pulmonary:      Effort: Pulmonary effort is normal.      Breath sounds: Normal breath sounds.   Abdominal:      General: There is no distension.      Palpations: Abdomen is soft. There is no hepatomegaly or splenomegaly.      Tenderness: There is no abdominal tenderness.      Hernia: No hernia is present.   Musculoskeletal:         General: No deformity. Normal range of motion.      Cervical back: Neck supple.   Skin:     General: Skin is warm and dry.   Neurological:      Mental Status: She is alert and oriented to person, place, and time.               Assessment   Diagnoses and all orders for this visit:    1. Colitis (Primary)  -     Case Request; Standing  -     Case Request    Other orders  -     Follow Anesthesia Guidelines / Protocol; Future  -     Follow Anesthesia Guidelines / Protocol; Standing  -     Verify / Perform Chlorhexidine Skin Prep; Standing  -     Obtain Informed Consent; Future  -     Provide NPO Instructions to Patient; Future  -     Chlorhexidine Skin Prep; Future  -     Place Sequential Compression Device; Standing  -     Maintain Sequential Compression Device; Standing  -     sodium-potassium-magnesium sulfates (Suprep Bowel Prep Kit) 17.5-3.13-1.6 GM/177ML solution oral solution; Take 1 bottle by mouth Every 12 (Twelve) Hours.  Dispense: 354 mL; Refill: 0      Valerie Stroud is a 74 y.o. female with bright red blood per rectum either from hemorrhoids or more likely colitis based on imaging findings.  Colitis is of uncertain etiology she will follow-up for colonoscopy.

## 2024-06-12 NOTE — H&P (VIEW-ONLY)
Subjective   Valerie Stroud is a 74 y.o. female is being seen for consultation today at the request of Kendrick Lemus MD    Valerie Stroud is a 74 y.o. female History of Present Illness  With recent presentation to the emergency department with blood per rectum.  She states she had drops in the bowl and when wiping continue to have bright red blood.  No noted hemorrhoids.  CT at the emergency department shows possible colitis of uncertain etiology.  No abdominal pain reported.  No fever or systemic symptoms.  She is several years out from her last colonoscopy.  At that time she was noted to have polyps.  Rectal Bleeding  Pertinent negatives include no abdominal pain, chest pain, chills, coughing, fever, headaches, joint swelling, nausea, rash, sore throat, vomiting or weakness.       Past Medical History:   Diagnosis Date    Arthritis     DMII (diabetes mellitus, type 2)     Dyslipidemia     Elevated cholesterol     GERD (gastroesophageal reflux disease)     HTN (hypertension)     Hyperlipidemia     Type 1 diabetes mellitus 12/20/2017    Type 2 diabetes mellitus with complication 12/20/2017       Family History   Problem Relation Age of Onset    Coronary artery disease Mother     Coronary artery disease Father 69        fatal MI age 69    Heart attack Father     Heart defect Sister         pacemaker    Liver cancer Sister     Heart attack Brother 29        s/p CAGB    Coronary artery disease Brother         fatal MI, age 50    Heart attack Brother 39    Breast cancer Neg Hx        Social History     Socioeconomic History    Marital status:    Tobacco Use    Smoking status: Every Day     Current packs/day: 0.50     Average packs/day: 0.5 packs/day for 25.0 years (12.5 ttl pk-yrs)     Types: Cigarettes    Smokeless tobacco: Never   Vaping Use    Vaping status: Never Used   Substance and Sexual Activity    Alcohol use: No    Drug use: No    Sexual activity: Defer       Past Surgical History:   Procedure  "Laterality Date    BLADDER SUSPENSION      CATARACT EXTRACTION W/ INTRAOCULAR LENS  IMPLANT, BILATERAL      CHOLECYSTECTOMY  11/04/2015    laparoscopic    COLONOSCOPY  03/30/2009    COLONOSCOPY N/A 12/1/2017    Procedure: COLONOSCOPY FOR SCREENING CPT CODE: ;  Surgeon: Deshawn Ibanez III, MD;  Location: [facility];  Service:     ENDOSCOPY  02/2002    ENDOSCOPY N/A 12/1/2017    Procedure: ESOPHAGOGASTRODUODENOSCOPY WITH BIOPSY CPT CODE: 19850;  Surgeon: Deshawn Ibanez III, MD;  Location: [facility];  Service:     ENDOSCOPY N/A 7/12/2019    Procedure: ESOPHAGOGASTRODUODENOSCOPY WITH BIOPSY CPT CODE: 22922;  Surgeon: Néstor Logan MD;  Location: [facility];  Service: Gastroenterology    KNEE SURGERY Bilateral     SUBTOTAL HYSTERECTOMY      VARICOSE VEIN SURGERY         Review of Systems   Constitutional:  Negative for activity change, appetite change, chills and fever.   HENT:  Negative for sore throat and trouble swallowing.    Eyes:  Negative for visual disturbance.   Respiratory:  Negative for cough and shortness of breath.    Cardiovascular:  Negative for chest pain and palpitations.   Gastrointestinal:  Negative for abdominal distention, abdominal pain, blood in stool, constipation, diarrhea, nausea and vomiting.   Endocrine: Negative for cold intolerance and heat intolerance.   Genitourinary:  Negative for dysuria.   Musculoskeletal:  Negative for joint swelling.   Skin:  Negative for color change, rash and wound.   Allergic/Immunologic: Negative for immunocompromised state.   Neurological:  Negative for dizziness, seizures, weakness and headaches.   Hematological:  Negative for adenopathy. Does not bruise/bleed easily.   Psychiatric/Behavioral:  Negative for agitation and confusion.          Ht 157.5 cm (62\")   Wt 67.6 kg (149 lb)   BMI 27.25 kg/m²   Objective   Physical Exam  Constitutional:       Appearance: She is well-developed.   HENT:      Head: Normocephalic and atraumatic.   Eyes:      " Conjunctiva/sclera: Conjunctivae normal.      Pupils: Pupils are equal, round, and reactive to light.   Neck:      Thyroid: No thyromegaly.      Vascular: No JVD.      Trachea: No tracheal deviation.   Cardiovascular:      Rate and Rhythm: Normal rate and regular rhythm.      Heart sounds: No murmur heard.     No friction rub. No gallop.   Pulmonary:      Effort: Pulmonary effort is normal.      Breath sounds: Normal breath sounds.   Abdominal:      General: There is no distension.      Palpations: Abdomen is soft. There is no hepatomegaly or splenomegaly.      Tenderness: There is no abdominal tenderness.      Hernia: No hernia is present.   Musculoskeletal:         General: No deformity. Normal range of motion.      Cervical back: Neck supple.   Skin:     General: Skin is warm and dry.   Neurological:      Mental Status: She is alert and oriented to person, place, and time.               Assessment   Diagnoses and all orders for this visit:    1. Colitis (Primary)  -     Case Request; Standing  -     Case Request    Other orders  -     Follow Anesthesia Guidelines / Protocol; Future  -     Follow Anesthesia Guidelines / Protocol; Standing  -     Verify / Perform Chlorhexidine Skin Prep; Standing  -     Obtain Informed Consent; Future  -     Provide NPO Instructions to Patient; Future  -     Chlorhexidine Skin Prep; Future  -     Place Sequential Compression Device; Standing  -     Maintain Sequential Compression Device; Standing  -     sodium-potassium-magnesium sulfates (Suprep Bowel Prep Kit) 17.5-3.13-1.6 GM/177ML solution oral solution; Take 1 bottle by mouth Every 12 (Twelve) Hours.  Dispense: 354 mL; Refill: 0      Valerie Stroud is a 74 y.o. female with bright red blood per rectum either from hemorrhoids or more likely colitis based on imaging findings.  Colitis is of uncertain etiology she will follow-up for colonoscopy.

## 2024-06-12 NOTE — PROGRESS NOTES
"Chief Complaint:    Chief Complaint   Patient presents with    Nephrolithiasis     Follow up        Vital Signs:   /75   Pulse 88   Ht 157.5 cm (62\")   Wt 67.9 kg (149 lb 12.8 oz)   BMI 27.40 kg/m²   Body mass index is 27.4 kg/m².      HPI:  Valerie Lopez is a 74 y.o. female who presents today for follow up    History of Present Illness  Ms. lopez presents to the clinic for follow-up for nephrolithiasis.  She was recently diagnosed with colitis and was given a prescription of Augmentin by Dr. Myers's which she finished this past Monday.  She had a recent CT scan of the abdomen pelvis that showed concerns of a possible distal left punctate ureteral calculus.  She did follow-up with Dr. Egan today and recommended to undergo a colonoscopy on July 3, 2024.  States she still has some intermittent pain but is otherwise doing well.  She denies any gross hematuria or difficulty urinating.  She does have some frequency but states this is usual for her.  Urine analysis in office today shows 3+ glucose only with no signs of leukocytes, nitrites, or microscopic/gross blood.  She has no other complaints.      Past Medical History:  Past Medical History:   Diagnosis Date    Arthritis     DMII (diabetes mellitus, type 2)     Dyslipidemia     Elevated cholesterol     GERD (gastroesophageal reflux disease)     HTN (hypertension)     Hyperlipidemia     Type 1 diabetes mellitus 12/20/2017    Type 2 diabetes mellitus with complication 12/20/2017       Current Meds:  Current Outpatient Medications   Medication Sig Dispense Refill    ALPRAZolam (XANAX) 0.25 MG tablet Take 1 tablet by mouth 2 (Two) Times a Day As Needed for Anxiety.      atorvastatin (LIPITOR) 40 MG tablet Take 1 tablet by mouth Daily.      glimepiride (AMARYL) 4 MG tablet Take 1 tablet by mouth Every Morning Before Breakfast.      HYDROcodone-acetaminophen (NORCO) 5-325 MG per tablet Take 1 tablet by mouth 4 (Four) Times a Day As Needed for Moderate Pain. " 12 tablet 0    Jardiance 25 MG tablet tablet Take 1 tablet by mouth Every Morning.      linaclotide (Linzess) 145 MCG capsule capsule Take 1 capsule by mouth Every Morning Before Breakfast.      Loratadine (Claritin) 10 MG capsule Take  by mouth.      losartan (COZAAR) 100 MG tablet Take 1 tablet by mouth Every Night. Takes 1 & 1/2 of 50mg 30 tablet 2    naloxone (NARCAN) 4 MG/0.1ML nasal spray Call 911. Don't prime. [place] in 1 nostril for overdose. Repeat in 2-3 minutes in other nostril if no or minimal breathing/responsiveness. 2 each 0    Pramoxine HCl, Perianal, (Proctofoam) 1 % foam Insert  into the rectum Every 2 (Two) Hours As Needed for Hemorrhoids. 15 g 0    QUEtiapine (SEROquel) 200 MG tablet Take 1 tablet by mouth Every Night.      Semaglutide,0.25 or 0.5MG/DOS, (Ozempic, 0.25 or 0.5 MG/DOSE,) 2 MG/1.5ML solution pen-injector Inject  under the skin into the appropriate area as directed 1 (One) Time Per Week.      sodium-potassium-magnesium sulfates (Suprep Bowel Prep Kit) 17.5-3.13-1.6 GM/177ML solution oral solution Take 1 bottle by mouth Every 12 (Twelve) Hours. 354 mL 0    tamsulosin (FLOMAX) 0.4 MG capsule 24 hr capsule Take 1 capsule by mouth Daily. 30 capsule 0    traMADol (ULTRAM) 50 MG tablet Take 1 tablet by mouth 2 (Two) Times a Day.       No current facility-administered medications for this visit.        Allergies:   No Known Allergies     Past Surgical History:  Past Surgical History:   Procedure Laterality Date    BLADDER SUSPENSION      CATARACT EXTRACTION W/ INTRAOCULAR LENS  IMPLANT, BILATERAL      CHOLECYSTECTOMY  11/04/2015    laparoscopic    COLONOSCOPY  03/30/2009    COLONOSCOPY N/A 12/1/2017    Procedure: COLONOSCOPY FOR SCREENING CPT CODE: ;  Surgeon: Deshwan Ibanez III, MD;  Location: [facility];  Service:     ENDOSCOPY  02/2002    ENDOSCOPY N/A 12/1/2017    Procedure: ESOPHAGOGASTRODUODENOSCOPY WITH BIOPSY CPT CODE: 66021;  Surgeon: Deshawn Ibanez III, MD;  Location:   [facility];  Service:     ENDOSCOPY N/A 7/12/2019    Procedure: ESOPHAGOGASTRODUODENOSCOPY WITH BIOPSY CPT CODE: 56603;  Surgeon: Néstor Logan MD;  Location: [facility] OR;  Service: Gastroenterology    KNEE SURGERY Bilateral     SUBTOTAL HYSTERECTOMY      VARICOSE VEIN SURGERY         Social History:  Social History     Socioeconomic History    Marital status:    Tobacco Use    Smoking status: Every Day     Current packs/day: 0.50     Average packs/day: 0.5 packs/day for 25.0 years (12.5 ttl pk-yrs)     Types: Cigarettes    Smokeless tobacco: Never   Vaping Use    Vaping status: Never Used   Substance and Sexual Activity    Alcohol use: No    Drug use: No    Sexual activity: Defer       Family History:  Family History   Problem Relation Age of Onset    Coronary artery disease Mother     Coronary artery disease Father 69        fatal MI age 69    Heart attack Father     Heart defect Sister         pacemaker    Liver cancer Sister     Heart attack Brother 29        s/p CAGB    Coronary artery disease Brother         fatal MI, age 50    Heart attack Brother 39    Breast cancer Neg Hx        Review of Systems:  Review of Systems   Constitutional:  Negative for chills, fatigue, fever and unexpected weight change.   HENT:  Negative for congestion and sinus pressure.    Respiratory:  Negative for chest tightness and shortness of breath.    Cardiovascular:  Negative for chest pain.   Gastrointestinal:  Positive for abdominal pain. Negative for constipation, diarrhea, nausea and vomiting.   Genitourinary:  Positive for frequency. Negative for difficulty urinating, dysuria, hematuria and urgency.   Musculoskeletal:  Negative for back pain and neck pain.   Skin:  Negative for rash.   Neurological:  Negative for dizziness and headaches.   Hematological:  Bruises/bleeds easily.   Psychiatric/Behavioral:  Negative for confusion and suicidal ideas. The patient is not nervous/anxious.        Physical Exam:  Physical  Exam  Constitutional:       General: She is not in acute distress.     Appearance: Normal appearance.   HENT:      Head: Normocephalic and atraumatic.      Nose: Nose normal.      Mouth/Throat:      Mouth: Mucous membranes are moist.   Eyes:      Conjunctiva/sclera: Conjunctivae normal.   Cardiovascular:      Rate and Rhythm: Normal rate and regular rhythm.      Pulses: Normal pulses.      Heart sounds: Normal heart sounds.   Pulmonary:      Effort: Pulmonary effort is normal.      Breath sounds: Normal breath sounds.   Abdominal:      General: Bowel sounds are normal.      Palpations: Abdomen is soft.   Musculoskeletal:         General: Normal range of motion.      Cervical back: Normal range of motion.   Skin:     General: Skin is warm.   Neurological:      General: No focal deficit present.      Mental Status: She is alert and oriented to person, place, and time.   Psychiatric:         Mood and Affect: Mood normal.         Behavior: Behavior normal.         Thought Content: Thought content normal.         Judgment: Judgment normal.         Recent Image (CT and/or KUB):   CT Abdomen and Pelvis: Results for orders placed during the hospital encounter of 11/08/21    CT Abdomen Pelvis With & Without Contrast    Narrative  EXAM:  CT Abdomen and Pelvis Without and With Intravenous Contrast    EXAM DATE:  11/8/2021 8:10 AM    CLINICAL HISTORY:  Bladder tumor; R31.0-Gross hematuria    TECHNIQUE:  Axial computed tomography images of the abdomen and pelvis without and  with intravenous contrast.  Sagittal and coronal reformatted images were  created and reviewed.  This CT exam was performed using one or more of  the following dose reduction techniques:  automated exposure control,  adjustment of the mA and/or kV according to patient size, and/or use of  iterative reconstruction technique.    COMPARISON:  06/27/2019    FINDINGS:  LUNG BASES:  Unremarkable.  No mass.  No consolidation.    ABDOMEN:  LIVER:  Unremarkable.  No  mass.  GALLBLADDER AND BILE DUCTS:  The gallbladder has been removed.  No  ductal dilation.  PANCREAS:  Unremarkable.  No mass.  No ductal dilation.  SPLEEN:  Unremarkable.  No splenomegaly.  ADRENALS:  Unremarkable.  No mass.  KIDNEYS AND URETERS:  Unremarkable.  No solid mass.  No obstructing  stones.  No hydronephrosis.  STOMACH AND BOWEL:  Unremarkable.  No obstruction.  No mucosal  thickening.    PELVIS:  APPENDIX:  No findings to suggest acute appendicitis.  BLADDER:  Unremarkable.  No mass.  No stones.  REPRODUCTIVE:  Unremarkable as visualized.    ABDOMEN and PELVIS:  INTRAPERITONEAL SPACE:  Unremarkable.  No free air.  No significant  fluid collection.  BONES/JOINTS:  No acute fracture.  No dislocation.  SOFT TISSUES:  Unremarkable.  VASCULATURE:  Vascular calcifications are noted.  No abdominal aortic  aneurysm.  LYMPH NODES:  Unremarkable.  No enlarged lymph nodes.    Impression  No acute findings in the abdomen or pelvis.    This report was finalized on 11/8/2021 8:49 AM by Dr. Jose R Galan MD.     CT Stone Protocol: No results found for this or any previous visit.     KUB: No results found for this or any previous visit.       Labs:  Brief Urine Lab Results  (Last result in the past 365 days)        Color   Clarity   Blood   Leuk Est   Nitrite   Protein   CREAT   Urine HCG        06/12/24 1149 Yellow   Clear   Negative   Negative   Negative   Negative                 Office Visit on 06/12/2024   Component Date Value Ref Range Status    Color 06/12/2024 Yellow  Yellow, Straw, Dark Yellow, Allison Final    Clarity, UA 06/12/2024 Clear  Clear Final    Specific Gravity  06/12/2024 1.010  1.005 - 1.030 Final    pH, Urine 06/12/2024 6.5  5.0 - 8.0 Final    Leukocytes 06/12/2024 Negative  Negative Final    Nitrite, UA 06/12/2024 Negative  Negative Final    Protein, POC 06/12/2024 Negative  Negative mg/dL Final    Glucose, UA 06/12/2024 3+ (A)  Negative mg/dL Final    Ketones, UA 06/12/2024 Negative  Negative  Final    Urobilinogen, UA 06/12/2024 Normal  Normal, 0.2 E.U./dL Final    Bilirubin 06/12/2024 Negative  Negative Final    Blood, UA 06/12/2024 Negative  Negative Final    Lot Number 06/12/2024 n   Final    Expiration Date 06/12/2024 n   Final   Admission on 05/26/2024, Discharged on 05/26/2024   Component Date Value Ref Range Status    Glucose 05/26/2024 153 (H)  65 - 99 mg/dL Final    BUN 05/26/2024 18  8 - 23 mg/dL Final    Creatinine 05/26/2024 0.76  0.57 - 1.00 mg/dL Final    Sodium 05/26/2024 140  136 - 145 mmol/L Final    Potassium 05/26/2024 4.0  3.5 - 5.2 mmol/L Final    Chloride 05/26/2024 106  98 - 107 mmol/L Final    CO2 05/26/2024 23.8  22.0 - 29.0 mmol/L Final    Calcium 05/26/2024 9.6  8.6 - 10.5 mg/dL Final    Total Protein 05/26/2024 7.1  6.0 - 8.5 g/dL Final    Albumin 05/26/2024 4.4  3.5 - 5.2 g/dL Final    ALT (SGPT) 05/26/2024 40 (H)  1 - 33 U/L Final    AST (SGOT) 05/26/2024 31  1 - 32 U/L Final    Alkaline Phosphatase 05/26/2024 71  39 - 117 U/L Final    Total Bilirubin 05/26/2024 0.4  0.0 - 1.2 mg/dL Final    Globulin 05/26/2024 2.7  gm/dL Final    A/G Ratio 05/26/2024 1.6  g/dL Final    BUN/Creatinine Ratio 05/26/2024 23.7  7.0 - 25.0 Final    Anion Gap 05/26/2024 10.2  5.0 - 15.0 mmol/L Final    eGFR 05/26/2024 81.8  >60.0 mL/min/1.73 Final    Color, UA 05/26/2024 Yellow  Yellow, Straw Final    Appearance, UA 05/26/2024 Clear  Clear Final    pH, UA 05/26/2024 7.5  5.0 - 8.0 Final    Specific Gravity, UA 05/26/2024 1.025  1.005 - 1.030 Final    Glucose, UA 05/26/2024 >=1000 mg/dL (3+) (A)  Negative Final    Ketones, UA 05/26/2024 15 mg/dL (1+) (A)  Negative Final    Bilirubin, UA 05/26/2024 Negative  Negative Final    Blood, UA 05/26/2024 Negative  Negative Final    Protein, UA 05/26/2024 Negative  Negative Final    Leuk Esterase, UA 05/26/2024 Negative  Negative Final    Nitrite, UA 05/26/2024 Negative  Negative Final    Urobilinogen, UA 05/26/2024 0.2 E.U./dL  0.2 - 1.0 E.U./dL Final     WBC 05/26/2024 12.67 (H)  3.40 - 10.80 10*3/mm3 Final    RBC 05/26/2024 4.85  3.77 - 5.28 10*6/mm3 Final    Hemoglobin 05/26/2024 15.1  12.0 - 15.9 g/dL Final    Hematocrit 05/26/2024 43.8  34.0 - 46.6 % Final    MCV 05/26/2024 90.3  79.0 - 97.0 fL Final    MCH 05/26/2024 31.1  26.6 - 33.0 pg Final    MCHC 05/26/2024 34.5  31.5 - 35.7 g/dL Final    RDW 05/26/2024 12.0 (L)  12.3 - 15.4 % Final    RDW-SD 05/26/2024 39.9  37.0 - 54.0 fl Final    MPV 05/26/2024 9.3  6.0 - 12.0 fL Final    Platelets 05/26/2024 228  140 - 450 10*3/mm3 Final    Neutrophil % 05/26/2024 83.4 (H)  42.7 - 76.0 % Final    Lymphocyte % 05/26/2024 9.2 (L)  19.6 - 45.3 % Final    Monocyte % 05/26/2024 6.5  5.0 - 12.0 % Final    Eosinophil % 05/26/2024 0.2 (L)  0.3 - 6.2 % Final    Basophil % 05/26/2024 0.3  0.0 - 1.5 % Final    Immature Grans % 05/26/2024 0.4  0.0 - 0.5 % Final    Neutrophils, Absolute 05/26/2024 10.57 (H)  1.70 - 7.00 10*3/mm3 Final    Lymphocytes, Absolute 05/26/2024 1.17  0.70 - 3.10 10*3/mm3 Final    Monocytes, Absolute 05/26/2024 0.82  0.10 - 0.90 10*3/mm3 Final    Eosinophils, Absolute 05/26/2024 0.02  0.00 - 0.40 10*3/mm3 Final    Basophils, Absolute 05/26/2024 0.04  0.00 - 0.20 10*3/mm3 Final    Immature Grans, Absolute 05/26/2024 0.05  0.00 - 0.05 10*3/mm3 Final    nRBC 05/26/2024 0.0  0.0 - 0.2 /100 WBC Final    Extra Tube 05/26/2024 Hold for add-ons.   Final    Auto resulted.    Extra Tube 05/26/2024 hold for add-on   Final    Auto resulted    Extra Tube 05/26/2024 Hold for add-ons.   Final    Auto resulted.    Extra Tube 05/26/2024 Hold for add-ons.   Final    Auto resulted    Campylobacter 05/26/2024 Not Detected  Not Detected Final    Plesiomonas shigelloides 05/26/2024 Not Detected  Not Detected Final    Salmonella 05/26/2024 Not Detected  Not Detected Final    Vibrio 05/26/2024 Not Detected  Not Detected Final    Vibrio cholerae 05/26/2024 Not Detected  Not Detected Final    Yersinia enterocolitica 05/26/2024 Not  Detected  Not Detected Final    Enteroaggregative E. coli (EAEC) 05/26/2024 Not Detected  Not Detected Final    Enteropathogenic E. coli (EPEC) 05/26/2024 Not Detected  Not Detected Final    Enterotoxigenic E. coli (ETEC) lt/* 05/26/2024 Not Detected  Not Detected Final    Shiga-like toxin-producing E. coli* 05/26/2024 Not Detected  Not Detected Final    Shigella/Enteroinvasive E. coli (E* 05/26/2024 Not Detected  Not Detected Final    Cryptosporidium 05/26/2024 Not Detected  Not Detected Final    Cyclospora cayetanensis 05/26/2024 Not Detected  Not Detected Final    Entamoeba histolytica 05/26/2024 Not Detected  Not Detected Final    Giardia lamblia 05/26/2024 Not Detected  Not Detected Final    Adenovirus F40/41 05/26/2024 Not Detected  Not Detected Final    Astrovirus 05/26/2024 Not Detected  Not Detected Final    Norovirus GI/GII 05/26/2024 Not Detected  Not Detected Final    Rotavirus A 05/26/2024 Not Detected  Not Detected Final    Sapovirus (I, II, IV or V) 05/26/2024 Not Detected  Not Detected Final    Toxigenic C. difficile by PCR 05/26/2024 Negative  Negative Final    027 Toxin 05/26/2024 Presumptive Negative   Final        Procedure: None  Procedures    I have reviewed and agree with the above PMH, PSH, FMH, social history, medications, allergies, and labs.      Assessment/Plan:   Problem List Items Addressed This Visit    None  Visit Diagnoses       Left ureteral stone    -  Primary    Relevant Orders    POC Urinalysis Dipstick, Automated (Completed)            Health Maintenance:   Health Maintenance Due   Topic Date Due    LIPID PANEL  Never done    URINE MICROALBUMIN  Never done    DXA SCAN  Never done    Pneumococcal Vaccine 65+ (1 of 2 - PCV) Never done    DIABETIC EYE EXAM  Never done    ZOSTER VACCINE (1 of 2) Never done    RSV Vaccine - Adults (1 - 1-dose 60+ series) Never done    HEPATITIS C SCREENING  Never done    ANNUAL WELLNESS VISIT  Never done    DIABETIC FOOT EXAM  Never done     HEMOGLOBIN A1C  Never done    TDAP/TD VACCINES (2 - Td or Tdap) 08/21/2022    COVID-19 Vaccine (3 - 2023-24 season) 09/01/2023        Smoking Counseling: Everyday smoker.  Never used smokeless tobacco.  Counseling given.    Urine Incontinence: Patient reports that she is not currently experiencing any symptoms of urinary incontinence.    Patient was given instructions and counseling regarding her condition or for health maintenance advice. Please see specific information pulled into the AVS if appropriate.    Patient Education:   Left ureteral stone -patient's urine analysis in office today is completely unremarkable.  She denies any new complaints given stone size on CT she is most likely passed this spontaneously.  Recommend to continue with observation.  Advised her she may discontinue Flomax at this time and we will see her back on an as-needed basis.    Visit Diagnoses:    ICD-10-CM ICD-9-CM   1. Left ureteral stone  N20.1 592.1     A total of 15 minutes were spent coordinating this patient’s care in clinic today; 10 minutes of which were face-to-face with the patient, reviewing medical history and counseling on the current treatment and followup plan.  All questions were answered to patient's satisfaction.    Meds Ordered During Visit:  No orders of the defined types were placed in this encounter.      Follow Up Appointment: As needed  No follow-ups on file.      This document has been electronically signed by Stanley Rivera PA-C   June 12, 2024 12:48 EDT    Part of this note may be an electronic transcription/translation of spoken language to printed text using the Dragon Dictation System.

## 2024-06-21 ENCOUNTER — DOCUMENTATION (OUTPATIENT)
Dept: SURGERY | Facility: CLINIC | Age: 74
End: 2024-06-21
Payer: MEDICARE

## 2024-06-21 NOTE — PROGRESS NOTES
You are scheduled for a colonoscopy with Dr. Egan on 7/3/24 at 8:00am.   Arrive at outpatient surgery on the ground floor of the hospital, opposite end of the ER location. There are [facility] signs located up the hill that arrow to the surgery side.   The entire day prior to colonoscopy, you will be on a clear liquid diet from the time you wake up until midnight.   The afternoon prior to procedure you will drink a bowel prep medication to clean you out. Follow the directions for the clear liquid diet and prep that you were given in the office.        Patient is aware with no questions at this time.

## 2024-07-03 ENCOUNTER — ANESTHESIA EVENT (OUTPATIENT)
Dept: PERIOP | Facility: HOSPITAL | Age: 74
End: 2024-07-03
Payer: MEDICARE

## 2024-07-03 ENCOUNTER — ANESTHESIA (OUTPATIENT)
Dept: PERIOP | Facility: HOSPITAL | Age: 74
End: 2024-07-03
Payer: MEDICARE

## 2024-07-03 ENCOUNTER — HOSPITAL ENCOUNTER (OUTPATIENT)
Facility: HOSPITAL | Age: 74
Setting detail: HOSPITAL OUTPATIENT SURGERY
Discharge: HOME | End: 2024-07-03
Attending: SURGERY | Admitting: SURGERY
Payer: MEDICARE

## 2024-07-03 VITALS
DIASTOLIC BLOOD PRESSURE: 59 MMHG | WEIGHT: 143 LBS | BODY MASS INDEX: 26.31 KG/M2 | HEIGHT: 62 IN | RESPIRATION RATE: 18 BRPM | SYSTOLIC BLOOD PRESSURE: 125 MMHG | OXYGEN SATURATION: 93 % | TEMPERATURE: 97.9 F | HEART RATE: 75 BPM

## 2024-07-03 PROCEDURE — 25010000002 PROPOFOL 200 MG/20ML EMULSION: Performed by: NURSE ANESTHETIST, CERTIFIED REGISTERED

## 2024-07-03 PROCEDURE — 25810000003 LACTATED RINGERS PER 1000 ML: Performed by: ANESTHESIOLOGY

## 2024-07-03 RX ORDER — SODIUM CHLORIDE, SODIUM LACTATE, POTASSIUM CHLORIDE, CALCIUM CHLORIDE 600; 310; 30; 20 MG/100ML; MG/100ML; MG/100ML; MG/100ML
100 INJECTION, SOLUTION INTRAVENOUS ONCE AS NEEDED
Status: DISCONTINUED | OUTPATIENT
Start: 2024-07-03 | End: 2024-07-03 | Stop reason: HOSPADM

## 2024-07-03 RX ORDER — SODIUM CHLORIDE 0.9 % (FLUSH) 0.9 %
10 SYRINGE (ML) INJECTION EVERY 12 HOURS SCHEDULED
Status: DISCONTINUED | OUTPATIENT
Start: 2024-07-03 | End: 2024-07-03 | Stop reason: HOSPADM

## 2024-07-03 RX ORDER — IPRATROPIUM BROMIDE AND ALBUTEROL SULFATE 2.5; .5 MG/3ML; MG/3ML
3 SOLUTION RESPIRATORY (INHALATION) ONCE AS NEEDED
Status: DISCONTINUED | OUTPATIENT
Start: 2024-07-03 | End: 2024-07-03 | Stop reason: HOSPADM

## 2024-07-03 RX ORDER — MIDAZOLAM HYDROCHLORIDE 1 MG/ML
0.5 INJECTION INTRAMUSCULAR; INTRAVENOUS
Status: DISCONTINUED | OUTPATIENT
Start: 2024-07-03 | End: 2024-07-03 | Stop reason: HOSPADM

## 2024-07-03 RX ORDER — SODIUM CHLORIDE, SODIUM LACTATE, POTASSIUM CHLORIDE, CALCIUM CHLORIDE 600; 310; 30; 20 MG/100ML; MG/100ML; MG/100ML; MG/100ML
125 INJECTION, SOLUTION INTRAVENOUS ONCE
Status: DISCONTINUED | OUTPATIENT
Start: 2024-07-03 | End: 2024-07-03 | Stop reason: HOSPADM

## 2024-07-03 RX ORDER — SODIUM CHLORIDE 9 MG/ML
40 INJECTION, SOLUTION INTRAVENOUS AS NEEDED
Status: DISCONTINUED | OUTPATIENT
Start: 2024-07-03 | End: 2024-07-03 | Stop reason: HOSPADM

## 2024-07-03 RX ORDER — KETOROLAC TROMETHAMINE 30 MG/ML
15 INJECTION, SOLUTION INTRAMUSCULAR; INTRAVENOUS EVERY 6 HOURS PRN
Status: DISCONTINUED | OUTPATIENT
Start: 2024-07-03 | End: 2024-07-03 | Stop reason: HOSPADM

## 2024-07-03 RX ORDER — MEPERIDINE HYDROCHLORIDE 25 MG/ML
12.5 INJECTION INTRAMUSCULAR; INTRAVENOUS; SUBCUTANEOUS
Status: DISCONTINUED | OUTPATIENT
Start: 2024-07-03 | End: 2024-07-03 | Stop reason: HOSPADM

## 2024-07-03 RX ORDER — OXYCODONE HYDROCHLORIDE AND ACETAMINOPHEN 5; 325 MG/1; MG/1
1 TABLET ORAL ONCE AS NEEDED
Status: DISCONTINUED | OUTPATIENT
Start: 2024-07-03 | End: 2024-07-03 | Stop reason: HOSPADM

## 2024-07-03 RX ORDER — FENTANYL CITRATE 50 UG/ML
50 INJECTION, SOLUTION INTRAMUSCULAR; INTRAVENOUS
Status: DISCONTINUED | OUTPATIENT
Start: 2024-07-03 | End: 2024-07-03 | Stop reason: HOSPADM

## 2024-07-03 RX ORDER — SODIUM CHLORIDE 0.9 % (FLUSH) 0.9 %
10 SYRINGE (ML) INJECTION AS NEEDED
Status: DISCONTINUED | OUTPATIENT
Start: 2024-07-03 | End: 2024-07-03 | Stop reason: HOSPADM

## 2024-07-03 RX ORDER — ONDANSETRON 2 MG/ML
4 INJECTION INTRAMUSCULAR; INTRAVENOUS AS NEEDED
Status: DISCONTINUED | OUTPATIENT
Start: 2024-07-03 | End: 2024-07-03 | Stop reason: HOSPADM

## 2024-07-03 RX ORDER — PROPOFOL 10 MG/ML
INJECTION, EMULSION INTRAVENOUS AS NEEDED
Status: DISCONTINUED | OUTPATIENT
Start: 2024-07-03 | End: 2024-07-03 | Stop reason: SURG

## 2024-07-03 RX ORDER — SODIUM CHLORIDE, SODIUM LACTATE, POTASSIUM CHLORIDE, CALCIUM CHLORIDE 600; 310; 30; 20 MG/100ML; MG/100ML; MG/100ML; MG/100ML
125 INJECTION, SOLUTION INTRAVENOUS ONCE
Status: COMPLETED | OUTPATIENT
Start: 2024-07-03 | End: 2024-07-03

## 2024-07-03 RX ADMIN — PROPOFOL 20 MG: 10 INJECTION, EMULSION INTRAVENOUS at 08:37

## 2024-07-03 RX ADMIN — PROPOFOL 20 MG: 10 INJECTION, EMULSION INTRAVENOUS at 08:40

## 2024-07-03 RX ADMIN — PROPOFOL 10 MG: 10 INJECTION, EMULSION INTRAVENOUS at 08:43

## 2024-07-03 RX ADMIN — PROPOFOL 20 MG: 10 INJECTION, EMULSION INTRAVENOUS at 08:36

## 2024-07-03 RX ADMIN — PROPOFOL 10 MG: 10 INJECTION, EMULSION INTRAVENOUS at 08:42

## 2024-07-03 RX ADMIN — PROPOFOL 100 MG: 10 INJECTION, EMULSION INTRAVENOUS at 08:35

## 2024-07-03 RX ADMIN — SODIUM CHLORIDE, POTASSIUM CHLORIDE, SODIUM LACTATE AND CALCIUM CHLORIDE: 600; 310; 30; 20 INJECTION, SOLUTION INTRAVENOUS at 08:26

## 2024-07-03 NOTE — ANESTHESIA POSTPROCEDURE EVALUATION
Patient: Valerie Stroud    Procedure Summary       Date: 07/03/24 Room / Location: [facility] OR [address] [place] [address] [facility] OR    Anesthesia Start: 0826 Anesthesia Stop: 0847    Procedure: COLONOSCOPY Diagnosis:       Colitis      (Colitis [K52.9])    Surgeons: Kevin Egan MD Provider: Adan Orozco MD    Anesthesia Type: general ASA Status: 3            Anesthesia Type: general    Vitals  Vitals Value Taken Time   /55 07/03/24 0855   Temp 97.9 °F (36.6 °C) 07/03/24 0850   Pulse 74 07/03/24 0859   Resp 18 07/03/24 0850   SpO2 98 % 07/03/24 0859   Vitals shown include unfiled device data.        Post Anesthesia Care and Evaluation    Patient location during evaluation: bedside  Patient participation: complete - patient participated  Level of consciousness: awake and alert  Pain score: 1  Pain management: adequate    Airway patency: patent  Anesthetic complications: No anesthetic complications  PONV Status: none  Cardiovascular status: acceptable  Respiratory status: acceptable  Hydration status: acceptable

## 2024-07-03 NOTE — ANESTHESIA PREPROCEDURE EVALUATION
Anesthesia Evaluation     Patient summary reviewed and Nursing notes reviewed   no history of anesthetic complications:   NPO Solid Status: > 8 hours  NPO Liquid Status: > 8 hours           Airway   Mallampati: II  TM distance: >3 FB  Neck ROM: full  no difficulty expected  Dental - normal exam   (+) edentulous    Pulmonary - normal exam   (+) a smoker Current, Smoked day of surgery, COPD,shortness of breath  Cardiovascular - normal exam  Exercise tolerance: good (4-7 METS)    NYHA Classification: II    (+) hypertension, dysrhythmias, hyperlipidemia  (-) past MI, angina, CHF      Neuro/Psych  (+) psychiatric history Depression  (-) seizures, CVA  GI/Hepatic/Renal/Endo    (+) GERD, GI bleeding , renal disease-, diabetes mellitus    Musculoskeletal     Abdominal  - normal exam    Bowel sounds: normal.   Substance History - negative use     OB/GYN negative ob/gyn ROS         Other   arthritis,                       Anesthesia Plan    ASA 3     general     intravenous induction     Anesthetic plan, risks, benefits, and alternatives have been provided, discussed and informed consent has been obtained with: patient.    Use of blood products discussed with patient  Consented to blood products.

## 2025-01-03 ENCOUNTER — TELEPHONE (OUTPATIENT)
Dept: CARDIOLOGY | Facility: CLINIC | Age: 75
End: 2025-01-03
Payer: MEDICARE

## 2025-02-12 ENCOUNTER — TELEPHONE (OUTPATIENT)
Dept: CARDIOLOGY | Facility: CLINIC | Age: 75
End: 2025-02-12
Payer: MEDICARE

## 2025-02-12 ENCOUNTER — OFFICE VISIT (OUTPATIENT)
Dept: CARDIOLOGY | Facility: CLINIC | Age: 75
End: 2025-02-12
Payer: MEDICARE

## 2025-02-12 VITALS
HEART RATE: 78 BPM | BODY MASS INDEX: 28.26 KG/M2 | WEIGHT: 153.6 LBS | HEIGHT: 62 IN | RESPIRATION RATE: 16 BRPM | OXYGEN SATURATION: 90 % | SYSTOLIC BLOOD PRESSURE: 154 MMHG | DIASTOLIC BLOOD PRESSURE: 77 MMHG

## 2025-02-12 DIAGNOSIS — R00.2 PALPITATION: ICD-10-CM

## 2025-02-12 DIAGNOSIS — I10 ESSENTIAL HYPERTENSION: Primary | ICD-10-CM

## 2025-02-12 DIAGNOSIS — E78.5 DYSLIPIDEMIA: ICD-10-CM

## 2025-02-12 PROCEDURE — 1159F MED LIST DOCD IN RCRD: CPT | Performed by: PHYSICIAN ASSISTANT

## 2025-02-12 PROCEDURE — 93000 ELECTROCARDIOGRAM COMPLETE: CPT | Performed by: PHYSICIAN ASSISTANT

## 2025-02-12 PROCEDURE — 1160F RVW MEDS BY RX/DR IN RCRD: CPT | Performed by: PHYSICIAN ASSISTANT

## 2025-02-12 PROCEDURE — 3077F SYST BP >= 140 MM HG: CPT | Performed by: PHYSICIAN ASSISTANT

## 2025-02-12 PROCEDURE — 99214 OFFICE O/P EST MOD 30 MIN: CPT | Performed by: PHYSICIAN ASSISTANT

## 2025-02-12 PROCEDURE — 3078F DIAST BP <80 MM HG: CPT | Performed by: PHYSICIAN ASSISTANT

## 2025-02-12 RX ORDER — CARVEDILOL 12.5 MG/1
12.5 TABLET ORAL 2 TIMES DAILY WITH MEALS
COMMUNITY

## 2025-02-12 RX ORDER — LEVOCETIRIZINE DIHYDROCHLORIDE 5 MG/1
5 TABLET, FILM COATED ORAL EVERY EVENING
COMMUNITY

## 2025-02-12 NOTE — TELEPHONE ENCOUNTER
Called PCP and LM with medical records.       ----- Message from Jayro Marquez sent at 2/12/2025 11:00 AM EST -----  Can we get labs from pcp

## 2025-02-12 NOTE — PROGRESS NOTES
Kendrick Lemus MD  Valerie Stroud  1950 02/12/2025    Patient Active Problem List   Diagnosis    Essential hypertension    Dyslipidemia    Dyspnea, unspecified    Early satiety    Dyspepsia    Epigastric abdominal pain    RUQ abdominal pain    Weight gain, abnormal    History of colon polyps    Constipation    Type 2 diabetes mellitus without complication    Bilateral leg edema with no clinical signs of heart failure.    Palpitation    Gastroesophageal reflux disease    Black stool    Bloating    Poor appetite    Renal cyst, left    Colitis       Dear Kendrick Lemus MD:    Subjective     History of Present Illness:    Chief Complaint   Patient presents with    Med Management     Med slips       Valerie Stroud is a pleasant 74 y.o. female with a past medical history significant for essential hypertension and dyslipidemia. She comes in today for for routine cardiology follow up.      Valerie did not get stress test and echocardiogram that I ordered last visit. She tells me didn't have this done due to kidney stones at the time. However, once she passed the stones she tells me that the back pain she was previously reporting resolved and has been chest pain and back pain free since. She denies any shortness of breath, syncope, or near syncope.     No Known Allergies:      Current Outpatient Medications:     ALPRAZolam (XANAX) 0.25 MG tablet, Take 1 tablet by mouth 2 (Two) Times a Day As Needed for Anxiety., Disp: , Rfl:     atorvastatin (LIPITOR) 40 MG tablet, Take 1 tablet by mouth Daily., Disp: , Rfl:     carvedilol (COREG) 12.5 MG tablet, Take 1 tablet by mouth 2 (Two) Times a Day With Meals., Disp: , Rfl:     glimepiride (AMARYL) 4 MG tablet, Take 1 tablet by mouth Every Morning Before Breakfast., Disp: , Rfl:     Jardiance 25 MG tablet tablet, Take 1 tablet by mouth Every Morning., Disp: , Rfl:     levocetirizine (XYZAL) 5 MG tablet, Take 1 tablet by mouth Every Evening., Disp: , Rfl:      losartan (COZAAR) 100 MG tablet, Take 1 tablet by mouth Every Night. Takes 1 & 1/2 of 50mg, Disp: 30 tablet, Rfl: 2    naloxone (NARCAN) 4 MG/0.1ML nasal spray, Call 911. Don't prime. Thornton in 1 nostril for overdose. Repeat in 2-3 minutes in other nostril if no or minimal breathing/responsiveness., Disp: 2 each, Rfl: 0    Pramoxine HCl, Perianal, (Proctofoam) 1 % foam, Insert  into the rectum Every 2 (Two) Hours As Needed for Hemorrhoids., Disp: 15 g, Rfl: 0    QUEtiapine (SEROquel) 200 MG tablet, Take 1 tablet by mouth Every Night., Disp: , Rfl:     tamsulosin (FLOMAX) 0.4 MG capsule 24 hr capsule, Take 1 capsule by mouth Daily., Disp: 30 capsule, Rfl: 0    Tirzepatide (Mounjaro) 10 MG/0.5ML solution auto-injector, Inject  under the skin into the appropriate area as directed., Disp: , Rfl:     traMADol (ULTRAM) 50 MG tablet, Take 1 tablet by mouth 2 (Two) Times a Day., Disp: , Rfl:     HYDROcodone-acetaminophen (NORCO) 5-325 MG per tablet, Take 1 tablet by mouth 4 (Four) Times a Day As Needed for Moderate Pain. (Patient not taking: Reported on 2/12/2025), Disp: 12 tablet, Rfl: 0    linaclotide (Linzess) 145 MCG capsule capsule, Take 1 capsule by mouth Every Morning Before Breakfast. (Patient not taking: Reported on 2/12/2025), Disp: , Rfl:     The following portions of the patient's history were reviewed and updated as appropriate: allergies, current medications, past family history, past medical history, past social history, past surgical history and problem list.    Social History     Tobacco Use    Smoking status: Every Day     Current packs/day: 0.50     Average packs/day: 0.5 packs/day for 25.0 years (12.5 ttl pk-yrs)     Types: Cigarettes    Smokeless tobacco: Never   Vaping Use    Vaping status: Never Used   Substance Use Topics    Alcohol use: No    Drug use: No         Objective   Vitals:    02/12/25 1041   BP: 154/77   Pulse: 78   Resp: 16   SpO2: 90%   Weight: 69.7 kg (153 lb 9.6 oz)   Height: 157.5 cm  "(62\")     Body mass index is 28.09 kg/m².    ROS    Constitutional:       General: Not in acute distress.     Appearance: Healthy appearance. Well-developed and not in distress. Not diaphoretic.   Eyes:      Conjunctiva/sclera: Conjunctivae normal.      Pupils: Pupils are equal, round, and reactive to light.   HENT:      Head: Normocephalic and atraumatic.   Neck:      Vascular: No carotid bruit or JVD.   Pulmonary:      Effort: Pulmonary effort is normal. No respiratory distress.      Breath sounds: Normal breath sounds.   Cardiovascular:      Normal rate. Regular rhythm.   Edema:     Peripheral edema absent.   Skin:     General: Skin is cool.   Neurological:      Mental Status: Alert, oriented to person, place, and time and oriented to person, place and time.         Lab Results   Component Value Date     05/26/2024    K 4.0 05/26/2024     05/26/2024    CO2 23.8 05/26/2024    BUN 18 05/26/2024    CREATININE 0.76 05/26/2024    GLUCOSE 153 (H) 05/26/2024    CALCIUM 9.6 05/26/2024    AST 31 05/26/2024    ALT 40 (H) 05/26/2024    ALKPHOS 71 05/26/2024    LABIL2 1.6 12/19/2015     Lab Results   Component Value Date    CKTOTAL 83 04/04/2018     Lab Results   Component Value Date    WBC 12.67 (H) 05/26/2024    HGB 15.1 05/26/2024    HCT 43.8 05/26/2024     05/26/2024     No results found for: \"INR\"  Lab Results   Component Value Date    MG 2.1 06/27/2019     Lab Results   Component Value Date    TSH 3.293 10/24/2017      No results found for: \"BNP\"    During this visit the following were done:  Labs Reviewed []    Labs Ordered []    Radiology Reports Reviewed []    Radiology Ordered []    PCP Records Reviewed []    Referring Provider Records Reviewed []    ER Records Reviewed []    Hospital Records Reviewed []    History Obtained From Family []    Radiology Images Reviewed []    Other Reviewed []    Records Requested []         ECG 12 Lead    Date/Time: 2/12/2025 10:56 AM  Performed by: Jayro Marquez " CHANTELLE VANN    Authorized by: Jayro Marquez PA-C  Comparison: compared with previous ECG   Similar to previous ECG  Rhythm: sinus rhythm  Conduction: conduction normal  ST Segments: ST segments normal    Clinical impression: normal ECG          Assessment & Plan    Diagnosis Plan   1. Essential hypertension        2. Dyslipidemia        3. Palpitation                 Recommendations:  Essential hypertension  Blood pressure is elevated in the office today. She thinks its typically better but also admits that she hasn't recently. She thinks it is likely up as she had to rush to make it here on time and the poor weather.   Given the above will continue current regimen but asked her to check at home if persistently elevated will adjust at next visit.   Dyslipidemia  Will request labs, for now continue lipitor.       No follow-ups on file.    As always, I appreciate very much the opportunity to participate in the cardiovascular care of your patients.      With Best Regards,    Jayro Marquez PA-C

## 2025-05-09 ENCOUNTER — TELEPHONE (OUTPATIENT)
Dept: CARDIOLOGY | Facility: CLINIC | Age: 75
End: 2025-05-09
Payer: MEDICARE

## 2025-08-13 ENCOUNTER — OFFICE VISIT (OUTPATIENT)
Dept: CARDIOLOGY | Facility: CLINIC | Age: 75
End: 2025-08-13
Payer: MEDICARE

## 2025-08-13 VITALS
RESPIRATION RATE: 16 BRPM | WEIGHT: 151.3 LBS | DIASTOLIC BLOOD PRESSURE: 71 MMHG | HEART RATE: 72 BPM | SYSTOLIC BLOOD PRESSURE: 118 MMHG | BODY MASS INDEX: 27.84 KG/M2 | OXYGEN SATURATION: 96 % | HEIGHT: 62 IN

## 2025-08-13 DIAGNOSIS — E78.5 DYSLIPIDEMIA: ICD-10-CM

## 2025-08-13 DIAGNOSIS — I10 ESSENTIAL HYPERTENSION: ICD-10-CM

## 2025-08-13 DIAGNOSIS — R60.0 PEDAL EDEMA: Primary | ICD-10-CM

## 2025-08-13 PROCEDURE — 99214 OFFICE O/P EST MOD 30 MIN: CPT | Performed by: PHYSICIAN ASSISTANT

## 2025-08-13 PROCEDURE — 3074F SYST BP LT 130 MM HG: CPT | Performed by: PHYSICIAN ASSISTANT

## 2025-08-13 PROCEDURE — 3078F DIAST BP <80 MM HG: CPT | Performed by: PHYSICIAN ASSISTANT

## 2025-08-13 PROCEDURE — 93000 ELECTROCARDIOGRAM COMPLETE: CPT | Performed by: PHYSICIAN ASSISTANT

## 2025-08-29 ENCOUNTER — HOSPITAL ENCOUNTER (OUTPATIENT)
Dept: CARDIOLOGY | Facility: HOSPITAL | Age: 75
Discharge: HOME | End: 2025-08-29
Payer: MEDICARE

## 2025-08-29 DIAGNOSIS — R60.0 PEDAL EDEMA: ICD-10-CM

## 2025-08-29 LAB
AORTIC DIMENSIONLESS INDEX: 0.73 (DI)
AV MEAN PRESS GRAD SYS DOP V1V2: 3 MMHG
AV VMAX SYS DOP: 109 CM/SEC
BH CV ECHO MEAS - ACS: 1.5 CM
BH CV ECHO MEAS - AO MAX PG: 4.8 MMHG
BH CV ECHO MEAS - AO ROOT DIAM: 3.1 CM
BH CV ECHO MEAS - AO V2 VTI: 25 CM
BH CV ECHO MEAS - AVA(I,D): 2.8 CM2
BH CV ECHO MEAS - EDV(CUBED): 91.1 ML
BH CV ECHO MEAS - EDV(MOD-SP2): 59.4 ML
BH CV ECHO MEAS - EDV(MOD-SP4): 55.9 ML
BH CV ECHO MEAS - EF(MOD-SP2): 55.9 %
BH CV ECHO MEAS - EF(MOD-SP4): 63 %
BH CV ECHO MEAS - ESV(CUBED): 39.3 ML
BH CV ECHO MEAS - ESV(MOD-SP2): 26.2 ML
BH CV ECHO MEAS - ESV(MOD-SP4): 20.7 ML
BH CV ECHO MEAS - FS: 24.4 %
BH CV ECHO MEAS - IVS/LVPW: 1 CM
BH CV ECHO MEAS - IVSD: 1 CM
BH CV ECHO MEAS - LA DIMENSION: 3.5 CM
BH CV ECHO MEAS - LAT PEAK E' VEL: 7.7 CM/SEC
BH CV ECHO MEAS - LV DIASTOLIC VOL/BSA (35-75): 33 CM2
BH CV ECHO MEAS - LV MASS(C)D: 153.3 GRAMS
BH CV ECHO MEAS - LV MAX PG: 2.45 MMHG
BH CV ECHO MEAS - LV MEAN PG: 1 MMHG
BH CV ECHO MEAS - LV SYSTOLIC VOL/BSA (12-30): 12.2 CM2
BH CV ECHO MEAS - LV V1 MAX: 78.3 CM/SEC
BH CV ECHO MEAS - LV V1 VTI: 18.2 CM
BH CV ECHO MEAS - LVIDD: 4.5 CM
BH CV ECHO MEAS - LVIDS: 3.4 CM
BH CV ECHO MEAS - LVOT AREA: 3.8 CM2
BH CV ECHO MEAS - LVOT DIAM: 2.2 CM
BH CV ECHO MEAS - LVPWD: 1 CM
BH CV ECHO MEAS - MED PEAK E' VEL: 5.9 CM/SEC
BH CV ECHO MEAS - MV A MAX VEL: 118 CM/SEC
BH CV ECHO MEAS - MV E MAX VEL: 90.6 CM/SEC
BH CV ECHO MEAS - MV E/A: 0.77
BH CV ECHO MEAS - PA ACC TIME: 0.13 SEC
BH CV ECHO MEAS - PA V2 MAX: 58.7 CM/SEC
BH CV ECHO MEAS - RVDD: 3.2 CM
BH CV ECHO MEAS - SV(LVOT): 69.2 ML
BH CV ECHO MEAS - SV(MOD-SP2): 33.2 ML
BH CV ECHO MEAS - SV(MOD-SP4): 35.2 ML
BH CV ECHO MEAS - SVI(LVOT): 40.8 ML/M2
BH CV ECHO MEAS - SVI(MOD-SP2): 19.6 ML/M2
BH CV ECHO MEAS - SVI(MOD-SP4): 20.8 ML/M2
BH CV ECHO MEAS - TAPSE (>1.6): 1.11 CM
BH CV ECHO MEAS - TR MAX PG: 24.4 MMHG
BH CV ECHO MEAS - TR MAX VEL: 247 CM/SEC
BH CV ECHO MEASUREMENTS AVERAGE E/E' RATIO: 13.32
BH CV XLRA - RV BASE: 2.7 CM
BH CV XLRA - RV LENGTH: 5.3 CM
BH CV XLRA - RV MID: 2.3 CM
LEFT ATRIUM VOLUME INDEX: 24.7 ML/M2
LV EF BIPLANE MOD: 57.4 %

## 2025-08-29 PROCEDURE — 93306 TTE W/DOPPLER COMPLETE: CPT

## (undated) DEVICE — Device

## (undated) DEVICE — CONN Y IRR DISP 1P/U

## (undated) DEVICE — SYR LUERLOK 30CC

## (undated) DEVICE — THE BITE BLOCK MAXI, LATEX FREE STRAP IS USED TO PROTECT THE ENDOSCOPE INSERTION TUBE FROM BEING BITTEN BY THE PATIENT.

## (undated) DEVICE — TUBING, SUCTION, 3/16" X 10', STRAIGHT: Brand: MEDLINE

## (undated) DEVICE — FRCP BX RADJAW4 NDL 2.8 240CM LG OG BX40

## (undated) DEVICE — GOWN,REINF,POLY,ECL,PP SLV,XL: Brand: MEDLINE

## (undated) DEVICE — Device: Brand: DEFENDO AIR/WATER/SUCTION AND BIOPSY VALVE

## (undated) DEVICE — SINGLE PORT MANIFOLD: Brand: NEPTUNE 2

## (undated) DEVICE — ENDOGATOR TUBING FOR ENDOGATOR EGP-100 IRRIGATION PUMP,OLYMPUS OFP PUMP, OLYMPUS AFU-100 PUMP AND ERBE EIP2 PUMP: Brand: ENDOGATOR

## (undated) DEVICE — TUBING, SUCTION, 1/4" X 20', STRAIGHT: Brand: MEDLINE INDUSTRIES, INC.

## (undated) DEVICE — Device: Brand: ENDOGATOR

## (undated) DEVICE — ENDOGATOR AUXILIARY WATER JET CONNECTOR: Brand: ENDOGATOR